# Patient Record
Sex: MALE | Race: WHITE | NOT HISPANIC OR LATINO | Employment: FULL TIME | URBAN - METROPOLITAN AREA
[De-identification: names, ages, dates, MRNs, and addresses within clinical notes are randomized per-mention and may not be internally consistent; named-entity substitution may affect disease eponyms.]

---

## 2017-06-18 DIAGNOSIS — E10.9 TYPE 1 DIABETES MELLITUS WITHOUT COMPLICATION (H): ICD-10-CM

## 2017-06-18 NOTE — LETTER
[facility]  [address] [facility] [address]  [phone]        June 25, 2018    Jon Ortiz  [address] [place] [id number]              Dear Jon Ortiz    This is to remind you that your FASTING LAB is due.    You may call our office at [phone] to schedule an appointment.    Please disregard this notice if you have already had your labs drawn or made an appointment.        Sincerely,        Deangelo Drew MD

## 2017-06-19 NOTE — TELEPHONE ENCOUNTER
HUMALOG 100 UNITS/ML VIAL           Last Written Prescription Date: 12/26/16  Last Fill Quantity: 1, # refills: 0  [place] [facility] or [facility] prescribing provider:  3/3/16        BP Readings from Last 3 Encounters:   03/03/16 124/88   07/16/14 115/77   12/17/13 133/87     Lab Results   Component Value Date    MICROL <5 03/03/2016     Lab Results   Component Value Date    UMALCR Unable to calculate due to low value 03/03/2016     Creatinine   Date Value Ref Range Status   03/03/2016 0.74 0.66 - 1.25 mg/dL Final   ]  GFR Estimate   Date Value Ref Range Status   03/03/2016 >90  Non  GFR Calc   >60 mL/min/1.7m2 Final   11/12/2013 >90 >60 mL/min/1.7m2 Final   11/06/2013 >90 >60 mL/min/1.7m2 Final     GFR Estimate If Black   Date Value Ref Range Status   03/03/2016 >90   GFR Calc   >60 mL/min/1.7m2 Final   11/12/2013 >90 >60 mL/min/1.7m2 Final   11/06/2013 >90 >60 mL/min/1.7m2 Final     Lab Results   Component Value Date    CHOL 177 03/03/2016     Lab Results   Component Value Date    HDL 97 03/03/2016     Lab Results   Component Value Date    LDL 72 03/03/2016     Lab Results   Component Value Date    TRIG 38 03/03/2016     Lab Results   Component Value Date    CHOLHDLRATIO 2.0 11/06/2013     Lab Results   Component Value Date    AST 23 03/03/2016     Lab Results   Component Value Date    ALT 31 03/03/2016     Lab Results   Component Value Date    A1C 5.8 03/03/2016    A1C 5.9 07/16/2014    A1C 5.9 11/06/2013    A1C 5.6 11/20/2012    A1C 6.2 02/22/2012     Potassium   Date Value Ref Range Status   03/03/2016 3.9 3.4 - 5.3 mmol/L Final

## 2017-06-20 NOTE — TELEPHONE ENCOUNTER
Medication is being filled for 1 time refill only due to:  Patient needs to be seen because it has been more than one year since last visit.   Yao Goins RN

## 2017-07-11 ENCOUNTER — OFFICE VISIT (OUTPATIENT)
Dept: FAMILY MEDICINE | Facility: CLINIC | Age: 42
End: 2017-07-11
Payer: COMMERCIAL

## 2017-07-11 VITALS
HEART RATE: 95 BPM | TEMPERATURE: 97.7 F | BODY MASS INDEX: 30.07 KG/M2 | SYSTOLIC BLOOD PRESSURE: 129 MMHG | DIASTOLIC BLOOD PRESSURE: 89 MMHG | WEIGHT: 198.4 LBS | HEIGHT: 68 IN

## 2017-07-11 DIAGNOSIS — E10.9 TYPE 1 DIABETES MELLITUS WITHOUT COMPLICATION (H): ICD-10-CM

## 2017-07-11 LAB
ANION GAP SERPL CALCULATED.3IONS-SCNC: 6 MMOL/L (ref 3–14)
BUN SERPL-MCNC: 11 MG/DL (ref 7–30)
CALCIUM SERPL-MCNC: 8.8 MG/DL (ref 8.5–10.1)
CHLORIDE SERPL-SCNC: 104 MMOL/L (ref 94–109)
CO2 SERPL-SCNC: 27 MMOL/L (ref 20–32)
CREAT SERPL-MCNC: 0.86 MG/DL (ref 0.66–1.25)
CREAT UR-MCNC: 24 MG/DL
GFR SERPL CREATININE-BSD FRML MDRD: ABNORMAL ML/MIN/1.7M2
GLUCOSE SERPL-MCNC: 114 MG/DL (ref 70–99)
HBA1C MFR BLD: 5.6 % (ref 4.3–6)
MICROALBUMIN UR-MCNC: <5 MG/L
MICROALBUMIN/CREAT UR: NORMAL MG/G CR (ref 0–17)
POTASSIUM SERPL-SCNC: 3.7 MMOL/L (ref 3.4–5.3)
SODIUM SERPL-SCNC: 137 MMOL/L (ref 133–144)

## 2017-07-11 PROCEDURE — 82043 UR ALBUMIN QUANTITATIVE: CPT | Performed by: FAMILY MEDICINE

## 2017-07-11 PROCEDURE — 99214 OFFICE O/P EST MOD 30 MIN: CPT | Performed by: FAMILY MEDICINE

## 2017-07-11 PROCEDURE — 83036 HEMOGLOBIN GLYCOSYLATED A1C: CPT | Performed by: FAMILY MEDICINE

## 2017-07-11 PROCEDURE — 80048 BASIC METABOLIC PNL TOTAL CA: CPT | Performed by: FAMILY MEDICINE

## 2017-07-11 PROCEDURE — 36415 COLL VENOUS BLD VENIPUNCTURE: CPT | Performed by: FAMILY MEDICINE

## 2017-07-11 NOTE — LETTER
[facility]  [address] [address] [facility] [address]  Phone: [phone]    July 13, 2017    Jon Ortiz  [address] [place] [address]              Dear Mr. Ortiz,    okay test result              Sincerely,      Dr. Deangelo Drew

## 2017-07-11 NOTE — PROGRESS NOTES
SUBJECTIVE:                                                    Jon Ortiz is a 42 year old male who presents to clinic today for the following health issues:    Diabetes Follow-up    Patient is checking blood sugars: 8-10 times daily.    Blood sugar testing frequency justification: Adjustment of medication(s)  Results are as follows:         Average for last 90 days 115, average for the last 30 days was 103    Diabetic concerns: None     Symptoms of hypoglycemia (low blood sugar): none     Paresthesias (numbness or burning in feet) or sores: No     Date of last diabetic eye exam: unknown      Amount of exercise or physical activity: None    Problems taking medications regularly: No    Medication side effects: none    Diet: regular (no restrictions)    Wt Readings from Last 10 Encounters:   07/11/17 198 lb 6.4 oz (90 kg)   03/03/16 191 lb 4.8 oz (86.8 kg)   07/16/14 160 lb 4.8 oz (72.7 kg)   12/17/13 178 lb 3.2 oz (80.8 kg)   11/06/13 173 lb 4.8 oz (78.6 kg)   11/20/12 169 lb (76.7 kg)   02/22/12 159 lb (72.1 kg)   03/02/11 169 lb (76.7 kg)   04/28/10 163 lb 8 oz (74.2 kg)   10/21/09 175 lb 12.8 oz (79.7 kg)           Problem list and histories reviewed & adjusted, as indicated.  Additional history: as documented    Patient Active Problem List   Diagnosis     Contact dermatitis and other eczema, due to unspecified cause     Other acne     CARDIOVASCULAR SCREENING; LDL GOAL LESS THAN 100     Health Care Home     Type 1 diabetes mellitus without complication (H)     Past Surgical History:   Procedure Laterality Date     C LASIK CONVENTIONAL  04/2008    Bilateral eyes.       Social History   Substance Use Topics     Smoking status: Never Smoker     Smokeless tobacco: Never Used     Alcohol use Yes      Comment: Rarely     Family History   Problem Relation Age of Onset     Allergies Mother      Arthritis Mother      CANCER Mother      HEART DISEASE Mother      HEART DISEASE Father      CANCER Father       "CEREBROVASCULAR DISEASE Maternal Grandfather      Alzheimer Disease Maternal Grandfather      HEART DISEASE Maternal Grandfather      HEART DISEASE Paternal Grandmother      Alcohol/Drug Paternal Grandmother      CEREBROVASCULAR DISEASE Paternal Grandfather      Alzheimer Disease Paternal Grandfather            Reviewed and updated as needed this visit by clinical staff       Reviewed and updated as needed this visit by Provider         ROS:  Constitutional, HEENT, cardiovascular, pulmonary, gi and gu systems are negative, except as otherwise noted.    OBJECTIVE:     /89  Pulse 95  Temp 97.7  F (36.5  C) (Tympanic)  Ht 5' 8\" (1.727 m)  Wt 198 lb 6.4 oz (90 kg)  BMI 30.17 kg/m2  Body mass index is 30.17 kg/(m^2).  GENERAL: healthy, alert and no distress  NECK: no adenopathy, no asymmetry, masses, or scars and thyroid normal to palpation  RESP: lungs clear to auscultation - no rales, rhonchi or wheezes  CV: regular rate and rhythm, normal S1 S2, no S3 or S4, no murmur, click or rub, no peripheral edema and peripheral pulses strong  ABDOMEN: soft, nontender, no hepatosplenomegaly, no masses and bowel sounds normal  MS: no gross musculoskeletal defects noted, no edema    Diagnostic Test Results:  none     ASSESSMENT/PLAN:     1. Type 1 diabetes mellitus without complication (H)   Good control.  Continue currant medications, continue to monito   - Hemoglobin A1c  - Basic metabolic panel  - Albumin Random Urine Quantitative    HDL(or \"good\" cholesterol) > ldl discussed stain use in diabetic. He declines now but will reconsiders when 47    Discussed ace inib   Pt declned   bp at goal    Will reconsider at age 47  Deangelo Drew MD  [facility]      "

## 2017-07-11 NOTE — NURSING NOTE
"Chief Complaint   Patient presents with     Diabetes       Initial There were no vitals taken for this visit. Estimated body mass index is 29.09 kg/(m^2) as calculated from the following:    Height as of 3/3/16: 5' 8\" (1.727 m).    Weight as of 3/3/16: 191 lb 4.8 oz (86.8 kg).  Medication Reconciliation: complete   Feli [place] CMA      "

## 2017-07-11 NOTE — MR AVS SNAPSHOT
"              After Visit Summary   2017    Jon Ortiz    MRN: [id number]           Patient Information     Date Of Birth          1975        Visit Information        Provider Department      2017 4:20 PM Deangelo Drew MD [facility]        Today's Diagnoses     Type 1 diabetes mellitus without complication (H)           Follow-ups after your visit        Who to contact     Normal or non-critical lab and imaging results will be communicated to you by [employer], letter or phone within 4 business days after the clinic has received the results. If you do not hear from us within 7 days, please contact the clinic through MyChart or phone. If you have a critical or abnormal lab result, we will notify you by phone as soon as possible.  Submit refill requests through [employer] or call your pharmacy and they will forward the refill request to us. Please allow 3 business days for your refill to be completed.          If you need to speak with a  for additional information , please call: [phone]             Additional Information About Your Visit        [employer] Information     [employer] lets you send messages to your doctor, view your test results, renew your prescriptions, schedule appointments and more. To sign up, go to [email or web address] . Click on \"Log in\" on the left side of the screen, which will take you to the Welcome page. Then click on \"Sign up Now\" on the right side of the page.     You will be asked to enter the access code listed below, as well as some personal information. Please follow the directions to create your username and password.     Your access code is: [email or web address]  Expires: 2017  1:20 PM     Your access code will  in 90 days. If you need help or a new code, please call your [facility] clinic or [phone].        Care EveryWhere ID     This is your Care EveryWhere ID. This could be used by other organizations to access your [facility] " "medical records  [id number]        Your Vitals Were     Pulse Temperature Height BMI (Body Mass Index)          95 97.7  F (36.5  C) (Tympanic) 5' 8\" (1.727 m) 30.17 kg/m2         Blood Pressure from Last 3 Encounters:   07/11/17 129/89   03/03/16 124/88   07/16/14 115/77    Weight from Last 3 Encounters:   07/11/17 198 lb 6.4 oz (90 kg)   03/03/16 191 lb 4.8 oz (86.8 kg)   07/16/14 160 lb 4.8 oz (72.7 kg)              We Performed the Following     Albumin Random Urine Quantitative     Basic metabolic panel     Hemoglobin A1c          Today's Medication Changes          These changes are accurate as of: 7/11/17 11:59 PM.  If you have any questions, ask your nurse or doctor.               These medicines have changed or have updated prescriptions.        Dose/Directions    insulin lispro 100 UNIT/ML injection   Commonly known as:  humaLOG   This may have changed:  See the new instructions.   Used for:  Type 1 diabetes mellitus without complication (H)   Changed by:  Deangelo Drew MD        USE FOR PUMP AS DIRECTED UP TO 90 UNITS PER DAY   Quantity:  30 mL   Refills:  11            Where to get your medicines      These medications were sent to [facility] [address] [address]     Phone:  [phone]     insulin lispro 100 UNIT/ML injection                Primary Care Provider Office Phone # Fax #    Deangelo Drew [phone]       [facility] [address] [facility] [address]        Equal Access to Services     JELANI SPARROW AH: Hadii aad ku hadasho Soomaali, waaxda luqadaha, qaybta kaalmada adeegyada, anthony early. So [facility] [phone].    ATENCIÓN: Si habla español, tiene a whitaker disposición servicios gratuitos de asistencia lingüística. Llame al [phone].    We comply with applicable federal civil rights laws and [place] laws. We do not discriminate on the basis of " race, color, national origin, age, disability sex, sexual orientation or gender identity.            Thank you!     Thank you for choosing [facility]  for your care. Our goal is always to provide you with excellent care. Hearing back from our patients is one way we can continue to improve our services. Please take a few minutes to complete the written survey that you may receive in the mail after your visit with us. Thank you!             Your Updated Medication List - Protect others around you: Learn how to safely use, store and throw away your medicines at www.disposemymeds.org.          This list is accurate as of: 7/11/17 11:59 PM.  Always use your most recent med list.                   Brand Name Dispense Instructions for use Diagnosis    * ACE NOT PRESCRIBED (INTENTIONAL)      by Other route continuous prn.    Type I (juvenile type) diabetes mellitus without mention of complication, not stated as uncontrolled       aspirin 81 MG tablet      Take 1 tablet by mouth daily.        blood glucose monitoring test strip    no brand specified    9 Box    Test blood sugars 11 times per day.    Type 1 diabetes mellitus without complication (H)       clindamycin 1 % topical gel    CLINDAMAX    180 g    Apply  topically 2 times daily.    Rosacea       insulin infusion pump Ying     1 Device    daily    Type I (juvenile type) diabetes mellitus without mention of complication, not stated as uncontrolled       insulin lispro 100 UNIT/ML injection    humaLOG    30 mL    USE FOR PUMP AS DIRECTED UP TO 90 UNITS PER DAY    Type 1 diabetes mellitus without complication (H)       Multi-vitamin Tabs tablet      Take 1 tablet by mouth daily        * STATIN NOT PRESCRIBED (INTENTIONAL)     0 each    1 each continuous prn Statin not prescribed intentionally due to Other: LDL < 100 w/o statin    Type 1 diabetes, HbA1c goal < 7% (H)       * Notice:  This list has 2 medication(s) that are the same as other medications  prescribed for you. Read the directions carefully, and ask your doctor or other care provider to review them with you.

## 2017-07-12 DIAGNOSIS — E10.9 TYPE 1 DIABETES MELLITUS WITHOUT COMPLICATION (H): ICD-10-CM

## 2017-12-19 ENCOUNTER — TELEPHONE (OUTPATIENT)
Dept: FAMILY MEDICINE | Facility: CLINIC | Age: 42
End: 2017-12-19

## 2017-12-19 DIAGNOSIS — Z13.6 CARDIOVASCULAR SCREENING; LDL GOAL LESS THAN 100: Primary | ICD-10-CM

## 2018-06-23 DIAGNOSIS — E10.9 TYPE 1 DIABETES MELLITUS WITHOUT COMPLICATION (H): ICD-10-CM

## 2018-06-25 NOTE — TELEPHONE ENCOUNTER
Medication is being filled for 1 time refill only due to:  Patient needs labs lipids,hgba1c,microalbumin,cmp, thyroid. Future labs ordered yes. Patient needs to be seen because it will be one year.   Yao Goins RN

## 2018-06-25 NOTE — TELEPHONE ENCOUNTER
"HUMALOG VIAL 10ML 100U/ML        Last Written Prescription Date:  7/11/17  Last Fill Quantity: 30,   # refills: 11  Last Office Visit: 7/11/17  Future Office visit:       Requested Prescriptions   Pending Prescriptions Disp Refills     HUMALOG 100 UNIT/ML injection [Pharmacy Med Name: HUMALOG VIAL 10ML 100U/ML] 30 mL 11     Sig: USE FOR PUMP AS DIRECTED UP TO 90 UNITS PER DAY    Short Acting Insulin Protocol Failed    6/23/2018 11:08 PM       Failed - Blood pressure less than 140/90 in past 6 months    BP Readings from Last 3 Encounters:   07/11/17 129/89   03/03/16 124/88   07/16/14 115/77                Failed - LDL on file in past 12 months    Recent Labs   Lab Test  03/03/16   0936   LDL  72            Failed - HgbA1C in past 3 or 6 months    If HgbA1C is 8 or greater, it needs to be on file within the past 3 months.  If less than 8, must be on file within the past 6 months.     Recent Labs   Lab Test  07/11/17   1643   A1C  5.6            Failed - Recent (6 mo) or future (30 days) visit within the authorizing provider's specialty    Patient had office visit in the last 6 months or has a visit in the next 30 days with authorizing provider or within the authorizing provider's specialty.  See \"Patient Info\" tab in inbasket, or \"Choose Columns\" in Meds & Orders section of the refill encounter.           Passed - Microalbumin on file in past 12 months    Recent Labs   Lab Test  07/11/17   1643   MICROL  <5   UMALCR  Unable to calculate due to low value            Passed - Serum creatinine on file in past 12 months    Recent Labs   Lab Test  07/11/17   1643   CR  0.86            Passed - Patient is age 18 or older            "

## 2018-07-13 ENCOUNTER — TELEPHONE (OUTPATIENT)
Dept: FAMILY MEDICINE | Facility: CLINIC | Age: 43
End: 2018-07-13

## 2018-07-13 NOTE — LETTER
January 8, 2019      Jon Ortiz  [address] [facility] [address]        Dear Jon,     As part of [facility]'s commitment to health and wellness we have reviewed your chart and it indicates that you are due for one or more of the following:    -- A diabetic check appointment with fasting blood work. The last A1c results we have for you were from 07/19/2018. It is important that we see you every 6 months to be sure we are doing everything we can to keep your diabetes under control and refill medications. Please call to schedule an appointment. Also, plan to come fasting at least 8-10 hours prior to your appointment for lab work.    Please try to schedule and/or complete the tests above within the next 2-4 weeks.   The number to call to schedule an appointment at [facility] is [phone].    While we work hard to maintain accurate records, it is always possible that this notice does not accurately reflect tests that you may have had. To ensure that we do not send you unnecessary notices please verify that we have accurate dates of your tests (even if these were done many years ago) or if you are seeking care at another clinic.      Sincerely,     Deangelo Drew MD/  [facility] Care Team

## 2018-07-13 NOTE — TELEPHONE ENCOUNTER
Panel Management Review      Patient has the following on his problem list:     Diabetes    ASA: Passed    Last A1C  Lab Results   Component Value Date    A1C 5.6 07/11/2017    A1C 5.8 03/03/2016    A1C 5.9 07/16/2014    A1C 5.9 11/06/2013    A1C 5.6 11/20/2012     A1C tested: Passed    Last LDL:    Lab Results   Component Value Date    CHOL 177 03/03/2016     Lab Results   Component Value Date    HDL 97 03/03/2016     Lab Results   Component Value Date    LDL 72 03/03/2016     Lab Results   Component Value Date    TRIG 38 03/03/2016     Lab Results   Component Value Date    CHOLHDLRATIO 2.0 11/06/2013     Lab Results   Component Value Date    NHDL 80 03/03/2016       Is the patient on a Statin? NO             Is the patient on Aspirin? YES    Medications     HMG CoA Reductase Inhibitors    STATIN NOT PRESCRIBED, INTENTIONAL,    STATIN NOT PRESCRIBED, INTENTIONAL,    Salicylates    aspirin 81 MG tablet          Last three blood pressure readings:  BP Readings from Last 3 Encounters:   07/11/17 129/89   03/03/16 124/88   07/16/14 115/77       Date of last diabetes office visit: 07/11/2017     Tobacco History:     History   Smoking Status     Never Smoker   Smokeless Tobacco     Never Used           Composite cancer screening  Chart review shows that this patient is due/due soon for the following None  Summary:    Patient is due/failing the following:   Diabetes, A1C and LDL    Action needed:   Patient needs office visit for a diabetic check.    Type of outreach:    Phone, left message for patient to call back.     Questions for provider review:    None                                                                                                                                    Esme Welsh CMA     Chart routed to self.

## 2018-07-18 NOTE — TELEPHONE ENCOUNTER
Please call, got refill requests.  I can cmplte that as soon as he scheduled f/u visit and has labs  A1c, chem 8, micro albumin, thyroid and fasting lipids checked.

## 2018-07-19 DIAGNOSIS — E10.9 TYPE 1 DIABETES MELLITUS WITHOUT COMPLICATION (H): ICD-10-CM

## 2018-07-19 DIAGNOSIS — E78.5 HYPERLIPIDEMIA LDL GOAL <100: Primary | ICD-10-CM

## 2018-07-19 LAB
ANION GAP SERPL CALCULATED.3IONS-SCNC: 8 MMOL/L (ref 3–14)
BUN SERPL-MCNC: 7 MG/DL (ref 7–30)
CALCIUM SERPL-MCNC: 8.5 MG/DL (ref 8.5–10.1)
CHLORIDE SERPL-SCNC: 103 MMOL/L (ref 94–109)
CHOLEST SERPL-MCNC: 206 MG/DL
CO2 SERPL-SCNC: 26 MMOL/L (ref 20–32)
CREAT SERPL-MCNC: 0.76 MG/DL (ref 0.66–1.25)
CREAT UR-MCNC: 39 MG/DL
GFR SERPL CREATININE-BSD FRML MDRD: >90 ML/MIN/1.7M2
GLUCOSE SERPL-MCNC: 142 MG/DL (ref 70–99)
HBA1C MFR BLD: 6.1 % (ref 0–5.6)
HDLC SERPL-MCNC: 66 MG/DL
LDLC SERPL CALC-MCNC: 124 MG/DL
MICROALBUMIN UR-MCNC: <5 MG/L
MICROALBUMIN/CREAT UR: NORMAL MG/G CR (ref 0–17)
NONHDLC SERPL-MCNC: 140 MG/DL
POTASSIUM SERPL-SCNC: 4 MMOL/L (ref 3.4–5.3)
SODIUM SERPL-SCNC: 137 MMOL/L (ref 133–144)
TRIGL SERPL-MCNC: 81 MG/DL
TSH SERPL DL<=0.005 MIU/L-ACNC: 1.2 MU/L (ref 0.4–4)

## 2018-07-19 PROCEDURE — 82043 UR ALBUMIN QUANTITATIVE: CPT | Performed by: FAMILY MEDICINE

## 2018-07-19 PROCEDURE — 84443 ASSAY THYROID STIM HORMONE: CPT | Performed by: FAMILY MEDICINE

## 2018-07-19 PROCEDURE — 83036 HEMOGLOBIN GLYCOSYLATED A1C: CPT | Performed by: FAMILY MEDICINE

## 2018-07-19 PROCEDURE — 36415 COLL VENOUS BLD VENIPUNCTURE: CPT | Performed by: FAMILY MEDICINE

## 2018-07-19 PROCEDURE — 80061 LIPID PANEL: CPT | Performed by: FAMILY MEDICINE

## 2018-07-19 PROCEDURE — 80048 BASIC METABOLIC PNL TOTAL CA: CPT | Performed by: FAMILY MEDICINE

## 2018-07-20 RX ORDER — ATORVASTATIN CALCIUM 10 MG/1
10 TABLET, FILM COATED ORAL DAILY
Qty: 90 TABLET | Refills: 3 | Status: SHIPPED | OUTPATIENT
Start: 2018-07-20 | End: 2020-05-13

## 2018-07-22 DIAGNOSIS — E10.9 TYPE 1 DIABETES MELLITUS WITHOUT COMPLICATION (H): ICD-10-CM

## 2018-07-23 NOTE — TELEPHONE ENCOUNTER
"HUMALOG VIAL 10ML 100U/ML        Last Written Prescription Date:  6/25/18  Last Fill Quantity: 30,   # refills: 0  Last Office Visit: 7/11/17  Future Office visit:    Next 5 appointments (look out 90 days)     Aug 02, 2018  4:20 PM CDT   [facility] Drew MD   [facility]    [address] [facility] [address] [phone]                   Requested Prescriptions   Pending Prescriptions Disp Refills     HUMALOG 100 UNIT/ML injection [Pharmacy Med Name: HUMALOG VIAL 10ML 100U/ML] 30 mL 0     Sig: USE FOR PUMP AS DIRECTED UP TO 90 UNITS PER DAY (NEEDS FASTING LAB AND DOCTOR APPOINTMENT BEFORE FURTHER REFILLS AS IT WILL BE OVER A YEAR)    Short Acting Insulin Protocol Failed    7/22/2018  3:03 PM       Failed - Blood pressure less than 140/90 in past 6 months    BP Readings from Last 3 Encounters:   07/11/17 129/89   03/03/16 124/88   07/16/14 115/77                Passed - LDL on file in past 12 months    Recent Labs   Lab Test  07/19/18   1415   LDL  124*            Passed - Microalbumin on file in past 12 months    Recent Labs   Lab Test  07/19/18   1422   MICROL  <5   UMALCR  Unable to calculate due to low value            Passed - Serum creatinine on file in past 12 months    Recent Labs   Lab Test  07/19/18   1415   CR  0.76            Passed - HgbA1C in past 3 or 6 months    If HgbA1C is 8 or greater, it needs to be on file within the past 3 months.  If less than 8, must be on file within the past 6 months.     Recent Labs   Lab Test  07/19/18   1415   A1C  6.1*            Passed - Patient is age 18 or older       Passed - Recent (6 mo) or future (30 days) visit within the authorizing provider's specialty    Patient had office visit in the last 6 months or has a visit in the next 30 days with authorizing provider or within the authorizing provider's specialty.  See \"Patient Info\" tab in inbasket, or \"Choose Columns\" in Meds & Orders section of the refill " encounter.

## 2018-07-24 NOTE — TELEPHONE ENCOUNTER
July 18, 2018      Deangelo Drew MD        Please call, got refill requests.  I can cmplte that as soon as he scheduled f/u visit and has labs  A1c, chem 8, micro albumin, thyroid and fasting lipids checked             Lab Results   Component Value Date    A1C 6.1 07/19/2018    A1C 5.6 07/11/2017    A1C 5.8 03/03/2016    A1C 5.9 07/16/2014    A1C 5.9 11/06/2013     Lab Results   Component Value Date    CHOL 206 07/19/2018     Lab Results   Component Value Date    HDL 66 07/19/2018     Lab Results   Component Value Date     07/19/2018     Lab Results   Component Value Date    TRIG 81 07/19/2018     Lab Results   Component Value Date    CHOLHDLRATIO 2.0 11/06/2013     TSH   Date Value Ref Range Status   07/19/2018 1.20 0.40 - 4.00 mU/L Final     Recent Labs   Lab Test  07/19/18   1415  07/11/17   1643   NA  137  137   POTASSIUM  4.0  3.7   CHLORIDE  103  104   CO2  26  27   ANIONGAP  8  6   GLC  142*  114*   BUN  7  11   CR  0.76  0.86   CARMELINA  8.5  8.8     Refilled x 30 days as patient has completed labs and has follow up appointment on 8/2/18.    Anamaria Roberts RN

## 2018-08-13 ENCOUNTER — OFFICE VISIT (OUTPATIENT)
Dept: FAMILY MEDICINE | Facility: CLINIC | Age: 43
End: 2018-08-13
Payer: COMMERCIAL

## 2018-08-13 VITALS
HEART RATE: 74 BPM | BODY MASS INDEX: 32.25 KG/M2 | DIASTOLIC BLOOD PRESSURE: 85 MMHG | SYSTOLIC BLOOD PRESSURE: 125 MMHG | WEIGHT: 212.8 LBS | HEIGHT: 68 IN | TEMPERATURE: 97.9 F

## 2018-08-13 DIAGNOSIS — Z11.4 SCREENING FOR HIV (HUMAN IMMUNODEFICIENCY VIRUS): ICD-10-CM

## 2018-08-13 DIAGNOSIS — Z13.89 SCREENING FOR DIABETIC PERIPHERAL NEUROPATHY: ICD-10-CM

## 2018-08-13 DIAGNOSIS — E10.9 TYPE 1 DIABETES MELLITUS WITHOUT COMPLICATION (H): Primary | ICD-10-CM

## 2018-08-13 DIAGNOSIS — Z13.6 CARDIOVASCULAR SCREENING; LDL GOAL LESS THAN 100: ICD-10-CM

## 2018-08-13 PROCEDURE — 99207 C FOOT EXAM  NO CHARGE: CPT | Performed by: FAMILY MEDICINE

## 2018-08-13 PROCEDURE — 99214 OFFICE O/P EST MOD 30 MIN: CPT | Performed by: FAMILY MEDICINE

## 2018-08-13 ASSESSMENT — PAIN SCALES - GENERAL: PAINLEVEL: NO PAIN (0)

## 2018-08-13 NOTE — MR AVS SNAPSHOT
"              After Visit Summary   2018    Jon Ortiz    MRN: [id number]           Patient Information     Date Of Birth          1975        Visit Information        Provider Department      2018 6:20 PM Deangelo Drew MD [facility]        Today's Diagnoses     Type 1 diabetes mellitus without complication (H)    -  1    Screening for HIV (human immunodeficiency virus)        Screening for diabetic peripheral neuropathy        CARDIOVASCULAR SCREENING; LDL GOAL LESS THAN 100           Follow-ups after your visit        Who to contact     Normal or non-critical lab and imaging results will be communicated to you by Omnicademyhart, letter or phone within 4 business days after the clinic has received the results. If you do not hear from us within 7 days, please contact the clinic through Omnicademyhart or phone. If you have a critical or abnormal lab result, we will notify you by phone as soon as possible.  Submit refill requests through Mashape or call your pharmacy and they will forward the refill request to us. Please allow 3 business days for your refill to be completed.          If you need to speak with a  for additional information , please call: [phone]             Additional Information About Your Visit        [facility] Information     Mashape lets you send messages to your doctor, view your test results, renew your prescriptions, schedule appointments and more. To sign up, go to [email or web address] . Click on \"Log in\" on the left side of the screen, which will take you to the Welcome page. Then click on \"Sign up Now\" on the right side of the page.     You will be asked to enter the access code listed below, as well as some personal information. Please follow the directions to create your username and password.     Your access code is: [email or web address]  Expires: 2018  5:22 PM     Your access code will  in 90 days. If you need help or a new code, please call " "your [place] clinic or [phone].        Care EveryWhere ID     This is your Care EveryWhere ID. This could be used by other organizations to access your [place] medical records  [id number]        Your Vitals Were     Pulse Temperature Height BMI (Body Mass Index)          74 97.9  F (36.6  C) (Tympanic) 5' 8\" (1.727 m) 32.36 kg/m2         Blood Pressure from Last 3 Encounters:   08/13/18 125/85   07/11/17 129/89   03/03/16 124/88    Weight from Last 3 Encounters:   08/13/18 212 lb 12.8 oz (96.5 kg)   07/11/17 198 lb 6.4 oz (90 kg)   03/03/16 191 lb 4.8 oz (86.8 kg)              We Performed the Following     FOOT EXAM  NO CHARGE [14730.114]        Primary Care Provider Office Phone # Fax #    Deangelo Drew [phone] [id number] [facility] [address]        Equal Access to Services     [facility]ATHARINA [facility]ON[facility] : Hadii freeman ku hadasho Soomaali, waaxda luqadaha, qaybta kaalmada adeegyada, waxricarda valles . So [facility] [phone].    ATENCIÓN: Si habla español, tiene a whitaker disposición servicios gratuitos de asistencia lingüística. LlVan [facility] [phone].    We comply with applicable federal civil rights laws and [place] laws. We do not discriminate on the basis of race, color, national origin, age, disability, sex, sexual orientation, or gender identity.            Thank you!     Thank you for choosing [facility]  for your care. Our goal is always to provide you with excellent care. Hearing back from our patients is one way we can continue to improve our services. Please take a few minutes to complete the written survey that you may receive in the mail after your visit with us. Thank you!             Your Updated Medication List - Protect others around you: Learn how to safely use, store and throw away your medicines at www.disposemymeds.org.          This list is accurate as of 8/13/18 11:59 PM.  Always use your most recent med list.                   Brand " Name Dispense Instructions for use Diagnosis    * ACE NOT PRESCRIBED (INTENTIONAL)      by Other route continuous prn.    Type I (juvenile type) diabetes mellitus without mention of complication, not stated as uncontrolled       ACE/ARB/ARNI NOT PRESCRIBED (INTENTIONAL)      Please choose reason not prescribed, below    Type 1 diabetes mellitus without complication (H)       aspirin 81 MG tablet      Take 1 tablet by mouth daily.        atorvastatin 10 MG tablet    LIPITOR    90 tablet    Take 1 tablet (10 mg) by mouth daily    Hyperlipidemia LDL goal <100       blood glucose monitoring test strip    no brand specified    9 Box    Test blood sugars 11 times per day.    Type 1 diabetes mellitus without complication (H)       clindamycin 1 % topical gel    CLINDAMAX    180 g    Apply  topically 2 times daily.    Rosacea       insulin infusion pump Ying     1 Device    daily    Type I (juvenile type) diabetes mellitus without mention of complication, not stated as uncontrolled       insulin lispro 100 UNIT/ML injection    humaLOG    30 mL    USE FOR PUMP AS DIRECTED UP TO 90 UNITS PER DAY    Type 1 diabetes mellitus without complication (H)       Multi-vitamin Tabs tablet      Take 1 tablet by mouth daily        * STATIN NOT PRESCRIBED (INTENTIONAL)     0 each    1 each continuous prn Statin not prescribed intentionally due to Other: LDL < 100 w/o statin    Type 1 diabetes, HbA1c goal < 7% (H)       * Notice:  This list has 2 medication(s) that are the same as other medications prescribed for you. Read the directions carefully, and ask your doctor or other care provider to review them with you.

## 2018-08-13 NOTE — PROGRESS NOTES
SUBJECTIVE:                                                    Jon Ortiz is a 43 year old male who presents to clinic today for the following health issues:    Diabetes Follow-up    Patient is checking blood sugars: 6-10 times daily.    Blood sugar testing frequency justification: Patient modifying lifestyle changes (diet, exercise) with blood sugars  Results are as follows:         am - 100-120         lunchtime - 100-120         suppertime - 100-120    Diabetic concerns: None     Symptoms of hypoglycemia (low blood sugar): none     Paresthesias (numbness or burning in feet) or sores: No     Date of last diabetic eye exam: It has been awhile and he is aware he is due for one.     BP Readings from Last 2 Encounters:   08/13/18 125/85   07/11/17 129/89     Hemoglobin A1C (%)   Date Value   07/19/2018 6.1 (H)   07/11/2017 5.6     LDL Cholesterol Calculated (mg/dL)   Date Value   07/19/2018 124 (H)   03/03/2016 72       Diabetes Management Resources  Hyperlipidemia Follow-Up      Rate your low fat/cholesterol diet?: not monitoring fat    Taking statin?  Yes, no muscle aches from statin    Other lipid medications/supplements?:  none      Amount of exercise or physical activity: 1 day/week for an average of 45-60 minutes    Problems taking medications regularly: No    Medication side effects: none    Diet: regular (no restrictions)    Problem list and histories reviewed & adjusted, as indicated.  Additional history:     Patient Active Problem List   Diagnosis     Contact dermatitis and other eczema, due to unspecified cause     Other acne     CARDIOVASCULAR SCREENING; LDL GOAL LESS THAN 100     Health Care Home     Type 1 diabetes mellitus without complication (H)     Past Surgical History:   Procedure Laterality Date     C LASIK CONVENTIONAL  04/2008    Bilateral eyes.       Social History   Substance Use Topics     Smoking status: Never Smoker     Smokeless tobacco: Never Used     Alcohol use Yes      Comment:  "Rarely     Family History   Problem Relation Age of Onset     Allergies Mother      Arthritis Mother      Cancer Mother      HEART DISEASE Mother      HEART DISEASE Father      Cancer Father      Cerebrovascular Disease Maternal Grandfather      Alzheimer Disease Maternal Grandfather      HEART DISEASE Maternal Grandfather      HEART DISEASE Paternal Grandmother      Alcohol/Drug Paternal Grandmother      Cerebrovascular Disease Paternal Grandfather      Alzheimer Disease Paternal Grandfather            ROS:  Constitutional, HEENT, cardiovascular, pulmonary, gi and gu systems are negative, except as otherwise noted.    OBJECTIVE:                                                    /85 (BP Location: Right arm, Patient Position: Sitting, Cuff Size: Adult Large)  Pulse 74  Temp 97.9  F (36.6  C) (Tympanic)  Ht 5' 8\" (1.727 m)  Wt 212 lb 12.8 oz (96.5 kg)  BMI 32.36 kg/m2 Body mass index is 32.36 kg/(m^2).     GENERAL: healthy, alert, well nourished, well hydrated, no distress  HENT: ear canals- normal; TMs- normal; Nose- normal; Mouth- no ulcers, no lesions  NECK: no tenderness, no adenopathy, no asymmetry, no masses, no stiffness; thyroid- normal to palpation  RESP: lungs clear to auscultation - no rales, no rhonchi, no wheezes  CV: regular rates and rhythm, normal S1 S2, no S3 or S4 and no murmur, no click or rub -  ABDOMEN: soft, no tenderness, no  hepatosplenomegaly, no masses, normal bowel sounds       ASSESSMENT/PLAN:                                                       reports that he has never smoked. He has never used smokeless tobacco.  (E10.9) Type 1 diabetes mellitus without complication (H)  (primary encounter diagnosis)      (Z11.4) Screening for HIV (human immunodeficiency virus)  Plan: HIV Screening, CANCELED: HIV Screening     (Z13.89) Screening for diabetic peripheral neuropathy  Plan: FOOT EXAM  NO CHARGE [93884.114]        [facility]"

## 2018-08-29 DIAGNOSIS — E10.9 TYPE 1 DIABETES MELLITUS WITHOUT COMPLICATION (H): ICD-10-CM

## 2018-08-29 NOTE — TELEPHONE ENCOUNTER
Prescription approved per [facility] Refill Protocol.  Yao Goins RN

## 2018-08-29 NOTE — TELEPHONE ENCOUNTER
"HUMALOG VIAL 10ML 100U/ML        Last Written Prescription Date:  7/24/18  Last Fill Quantity: 30,   # refills: 0  Last Office Visit: 8/13/18  Future Office visit:       Requested Prescriptions   Pending Prescriptions Disp Refills     HUMALOG 100 UNIT/ML injection [Pharmacy Med Name: HUMALOG VIAL 10ML 100U/ML] 30 mL 0     Sig: USE FOR PUMP AS DIRECTED UP TO 90 UNITS PER DAY    Short Acting Insulin Protocol Passed    8/29/2018  9:22 AM       Passed - Blood pressure less than 140/90 in past 6 months    BP Readings from Last 3 Encounters:   08/13/18 125/85   07/11/17 129/89   03/03/16 124/88                Passed - LDL on file in past 12 months    Recent Labs   Lab Test  07/19/18   1415   LDL  124*            Passed - Microalbumin on file in past 12 months    Recent Labs   Lab Test  07/19/18   1422   MICROL  <5   UMALCR  Unable to calculate due to low value            Passed - Serum creatinine on file in past 12 months    Recent Labs   Lab Test  07/19/18   1415   CR  0.76            Passed - HgbA1C in past 3 or 6 months    If HgbA1C is 8 or greater, it needs to be on file within the past 3 months.  If less than 8, must be on file within the past 6 months.     Recent Labs   Lab Test  07/19/18   1415   A1C  6.1*            Passed - Patient is age 18 or older       Passed - Recent (6 mo) or future (30 days) visit within the authorizing provider's specialty    Patient had office visit in the last 6 months or has a visit in the next 30 days with authorizing provider or within the authorizing provider's specialty.  See \"Patient Info\" tab in inbasket, or \"Choose Columns\" in Meds & Orders section of the refill encounter.              "

## 2018-11-16 DIAGNOSIS — E10.9 TYPE 1 DIABETES MELLITUS WITHOUT COMPLICATION (H): ICD-10-CM

## 2018-11-16 NOTE — TELEPHONE ENCOUNTER
"Requested Prescriptions   Pending Prescriptions Disp Refills     HUMALOG 100 UNIT/ML injection [Pharmacy Med Name: HUMALOG VIAL 10ML 100U/ML] 30 mL 3     Sig: USE FOR PUMP AS DIRECTED UP TO 90 UNITS PER DAY    Short Acting Insulin Protocol Passed    11/16/2018  2:44 PM       Passed - Blood pressure less than 140/90 in past 6 months    BP Readings from Last 3 Encounters:   08/13/18 125/85   07/11/17 129/89   03/03/16 124/88                Passed - LDL on file in past 12 months    Recent Labs   Lab Test  07/19/18   1415   LDL  124*            Passed - Microalbumin on file in past 12 months    Recent Labs   Lab Test  07/19/18   1422   MICROL  <5   UMALCR  Unable to calculate due to low value            Passed - Serum creatinine on file in past 12 months    Recent Labs   Lab Test  07/19/18   1415   CR  0.76            Passed - HgbA1C in past 3 or 6 months    If HgbA1C is 8 or greater, it needs to be on file within the past 3 months.  If less than 8, must be on file within the past 6 months.     Recent Labs   Lab Test  07/19/18   1415   A1C  6.1*            Passed - Patient is age 18 or older       Passed - Recent (6 mo) or future (30 days) visit within the authorizing provider's specialty    Patient had office visit in the last 6 months or has a visit in the next 30 days with authorizing provider or within the authorizing provider's specialty.  See \"Patient Info\" tab in inbasket, or \"Choose Columns\" in Meds & Orders section of the refill encounter.            Last Written Prescription Date:  8/29/18  Last Fill Quantity: 30 ml,  # refills: 3   Last office visit: 8/13/2018 with prescribing provider:     Future Office Visit:      "

## 2019-02-13 DIAGNOSIS — E10.9 TYPE 1 DIABETES MELLITUS WITHOUT COMPLICATION (H): ICD-10-CM

## 2019-02-13 NOTE — TELEPHONE ENCOUNTER
"HUMALOG VIAL 10ML 100U/ML      Last Written Prescription Date:  11/19/18  Last Fill Quantity: 30,   # refills: 3  Last Office Visit: 8/13/18  Future Office visit:       Requested Prescriptions   Pending Prescriptions Disp Refills     HUMALOG 100 UNIT/ML injection [Pharmacy Med Name: HUMALOG VIAL 10ML 100U/ML] 30 mL 3     Sig: USE FOR PUMP AS DIRECTED UP TO 90 UNITS PER DAY    Short Acting Insulin Protocol Failed - 2/13/2019 11:11 AM       Failed - Blood pressure less than 140/90 in past 6 months    BP Readings from Last 3 Encounters:   08/13/18 125/85   07/11/17 129/89   03/03/16 124/88                Failed - HgbA1C in past 3 or 6 months    If HgbA1C is 8 or greater, it needs to be on file within the past 3 months.  If less than 8, must be on file within the past 6 months.     Recent Labs   Lab Test 07/19/18  1415   A1C 6.1*            Failed - Recent (6 mo) or future (30 days) visit within the authorizing provider's specialty    Patient had office visit in the last 6 months or has a visit in the next 30 days with authorizing provider or within the authorizing provider's specialty.  See \"Patient Info\" tab in inbasket, or \"Choose Columns\" in Meds & Orders section of the refill encounter.           Passed - LDL on file in past 12 months    Recent Labs   Lab Test 07/19/18  1415   *            Passed - Microalbumin on file in past 12 months    Recent Labs   Lab Test 07/19/18  1422   MICROL <5   UMALCR Unable to calculate due to low value            Passed - Serum creatinine on file in past 12 months    Recent Labs   Lab Test 07/19/18  1415   CR 0.76            Passed - Medication is active on med list       Passed - Patient is age 18 or older          "

## 2019-02-22 ENCOUNTER — TELEPHONE (OUTPATIENT)
Dept: FAMILY MEDICINE | Facility: CLINIC | Age: 44
End: 2019-02-22

## 2019-03-22 DIAGNOSIS — E10.9 TYPE 1 DIABETES MELLITUS WITHOUT COMPLICATION (H): ICD-10-CM

## 2019-03-22 NOTE — TELEPHONE ENCOUNTER
"HUMALOG VIAL 10ML 100U/ML      Last Written Prescription Date:  2/13/19  Last Fill Quantity: 30,   # refills: 0  Last Office Visit: 8/13/18  Future Office visit:       Requested Prescriptions   Pending Prescriptions Disp Refills     HUMALOG 100 UNIT/ML injection [Pharmacy Med Name: HUMALOG VIAL 10ML 100U/ML] 30 mL 0     Sig: USE FOR PUMP AS DIRECTED UP TO 90 UNITS PER DAY    Short Acting Insulin Protocol Failed - 3/22/2019  3:09 PM       Failed - Blood pressure less than 140/90 in past 6 months    BP Readings from Last 3 Encounters:   08/13/18 125/85   07/11/17 129/89   03/03/16 124/88                Failed - HgbA1C in past 3 or 6 months    If HgbA1C is 8 or greater, it needs to be on file within the past 3 months.  If less than 8, must be on file within the past 6 months.     Recent Labs   Lab Test 07/19/18  1415   A1C 6.1*            Failed - Recent (6 mo) or future (30 days) visit within the authorizing provider's specialty    Patient had office visit in the last 6 months or has a visit in the next 30 days with authorizing provider or within the authorizing provider's specialty.  See \"Patient Info\" tab in inbasket, or \"Choose Columns\" in Meds & Orders section of the refill encounter.           Passed - LDL on file in past 12 months    Recent Labs   Lab Test 07/19/18  1415   *            Passed - Microalbumin on file in past 12 months    Recent Labs   Lab Test 07/19/18  1422   MICROL <5   UMALCR Unable to calculate due to low value            Passed - Serum creatinine on file in past 12 months    Recent Labs   Lab Test 07/19/18  1415   CR 0.76            Passed - Medication is active on med list       Passed - Patient is age 18 or older          "

## 2019-03-25 ENCOUNTER — TELEPHONE (OUTPATIENT)
Dept: FAMILY MEDICINE | Facility: CLINIC | Age: 44
End: 2019-03-25

## 2019-03-25 DIAGNOSIS — E10.9 TYPE 1 DIABETES MELLITUS WITHOUT COMPLICATION (H): Primary | ICD-10-CM

## 2019-03-25 NOTE — TELEPHONE ENCOUNTER
Reason for Call: Request for an order or referral:    Order or referral being requested: Jon has lab appointment on 3/29/19 for diabetic check on 4/1/19.  He needs A1C.  Back on 7/18 he had lipids done, but was suppose to come back in 3-6 months for recheck.  Would like that one done also.  He will be fasting.  Could you please review and place orders.  Thank you..Carmen Ferrell    Date needed: as soon as possible    Has the patient been seen by the PCP for this problem? NO - seeing provider on 4/1/19.      Phone number Patient can be reached at:  Home number on file [phone] (home)    Best Time:  No need to call unless something is changed or has questions.      Can we leave a detailed message on this number?  YES    Call taken on 3/25/2019 at 2:50 PM by Carmen Ferrell

## 2019-03-25 NOTE — TELEPHONE ENCOUNTER
Prescription approved per [facility] Refill Protocol.  Yao Goins RN

## 2019-03-29 DIAGNOSIS — E10.9 TYPE 1 DIABETES MELLITUS WITHOUT COMPLICATION (H): ICD-10-CM

## 2019-03-29 DIAGNOSIS — Z11.4 SCREENING FOR HIV (HUMAN IMMUNODEFICIENCY VIRUS): ICD-10-CM

## 2019-03-29 LAB
CHOLEST SERPL-MCNC: 161 MG/DL
HBA1C MFR BLD: 5.5 % (ref 0–5.6)
HDLC SERPL-MCNC: 68 MG/DL
LDLC SERPL CALC-MCNC: 86 MG/DL
NONHDLC SERPL-MCNC: 93 MG/DL
TRIGL SERPL-MCNC: 33 MG/DL

## 2019-03-29 PROCEDURE — 83036 HEMOGLOBIN GLYCOSYLATED A1C: CPT | Performed by: FAMILY MEDICINE

## 2019-03-29 PROCEDURE — 80061 LIPID PANEL: CPT | Performed by: FAMILY MEDICINE

## 2019-03-29 PROCEDURE — 36415 COLL VENOUS BLD VENIPUNCTURE: CPT | Performed by: FAMILY MEDICINE

## 2019-03-29 PROCEDURE — 87389 HIV-1 AG W/HIV-1&-2 AB AG IA: CPT | Performed by: FAMILY MEDICINE

## 2019-03-30 LAB — HIV 1+2 AB+HIV1 P24 AG SERPL QL IA: NONREACTIVE

## 2019-04-01 ENCOUNTER — OFFICE VISIT (OUTPATIENT)
Dept: FAMILY MEDICINE | Facility: CLINIC | Age: 44
End: 2019-04-01
Payer: COMMERCIAL

## 2019-04-01 VITALS
BODY MASS INDEX: 32.01 KG/M2 | TEMPERATURE: 97.9 F | HEIGHT: 68 IN | WEIGHT: 211.2 LBS | HEART RATE: 82 BPM | SYSTOLIC BLOOD PRESSURE: 133 MMHG | DIASTOLIC BLOOD PRESSURE: 88 MMHG | RESPIRATION RATE: 8 BRPM

## 2019-04-01 DIAGNOSIS — E10.9 TYPE 1 DIABETES MELLITUS WITHOUT COMPLICATION (H): Primary | ICD-10-CM

## 2019-04-01 DIAGNOSIS — L71.9 ROSACEA: ICD-10-CM

## 2019-04-01 PROCEDURE — 99214 OFFICE O/P EST MOD 30 MIN: CPT | Performed by: FAMILY MEDICINE

## 2019-04-01 RX ORDER — CLINDAMYCIN PHOSPHATE 10 MG/G
GEL TOPICAL
Qty: 180 G | Refills: 3 | Status: SHIPPED | OUTPATIENT
Start: 2019-04-01 | End: 2021-10-20

## 2019-04-01 ASSESSMENT — PAIN SCALES - GENERAL: PAINLEVEL: NO PAIN (0)

## 2019-04-01 ASSESSMENT — MIFFLIN-ST. JEOR: SCORE: 1837.5

## 2019-04-01 NOTE — PROGRESS NOTES
SUBJECTIVE:                                                    Jon Ortiz is a 43 year old male who presents to clinic today for the following health issues:    Diabetes Follow-up    Patient is checking blood sugars: 8 times daily.    Blood sugar testing frequency justification: Risk of hypoglycemia with medication(s)  Results are as follows:         The average is around 110    Diabetic concerns: None     Symptoms of hypoglycemia (low blood sugar): none     Paresthesias (numbness or burning in feet) or sores: No     Date of last diabetic eye exam: Has been awhile. He is aware that he is due.     211 lbs 3.2 oz    Wt Readings from Last 10 Encounters:   04/01/19 95.8 kg (211 lb 3.2 oz)   08/13/18 96.5 kg (212 lb 12.8 oz)   07/11/17 90 kg (198 lb 6.4 oz)   03/03/16 86.8 kg (191 lb 4.8 oz)   07/16/14 72.7 kg (160 lb 4.8 oz)   12/17/13 80.8 kg (178 lb 3.2 oz)   11/06/13 78.6 kg (173 lb 4.8 oz)   11/20/12 76.7 kg (169 lb)   02/22/12 72.1 kg (159 lb)   03/02/11 76.7 kg (169 lb)       BP Readings from Last 2 Encounters:   04/01/19 133/88   08/13/18 125/85     Hemoglobin A1C (%)   Date Value   03/29/2019 5.5   07/19/2018 6.1 (H)     LDL Cholesterol Calculated (mg/dL)   Date Value   03/29/2019 86   07/19/2018 124 (H)       Diabetes Management Resources  Hyperlipidemia Follow-Up      Rate your low fat/cholesterol diet?: not monitoring fat    Taking statin?  Yes, no muscle aches from statin    Other lipid medications/supplements?:  none      Amount of exercise or physical activity: 1 day/week for an average of 30-45 minutes    Problems taking medications regularly: No    Medication side effects: none    Diet: regular (no restrictions)      Problem list and histories reviewed & adjusted, as indicated.  Additional history:     Patient Active Problem List   Diagnosis     Contact dermatitis and other eczema, due to unspecified cause     Other acne     CARDIOVASCULAR SCREENING; LDL GOAL LESS THAN 100     [facility]      Type 1 diabetes mellitus without complication (H)     Past Surgical History:   Procedure Laterality Date     C LASIK CONVENTIONAL  04/2008    Bilateral eyes.       Social History     Tobacco Use     Smoking status: Never Smoker     Smokeless tobacco: Never Used   Substance Use Topics     Alcohol use: Yes     Comment: Rarely     Family History   Problem Relation Age of Onset     Allergies Mother      Arthritis Mother      Cancer Mother      Heart Disease Mother      Heart Disease Father      Cancer Father      Cerebrovascular Disease Maternal Grandfather      Alzheimer Disease Maternal Grandfather      Heart Disease Maternal Grandfather      Heart Disease Paternal Grandmother      Alcohol/Drug Paternal Grandmother      Cerebrovascular Disease Paternal Grandfather      Alzheimer Disease Paternal Grandfather          Current Outpatient Medications   Medication Sig Dispense Refill     atorvastatin (LIPITOR) 10 MG tablet Take 1 tablet (10 mg) by mouth daily 90 tablet 3     blood glucose monitoring (NO BRAND SPECIFIED) test strip Test blood sugars 11 times per day. 9 Box 3     clindamycin (CLINDAMAX) 1 % external gel Apply  topically 2 times daily. 180 g 3     HUMALOG 100 UNIT/ML injection USE FOR PUMP AS DIRECTED UP TO 90 UNITS PER DAY 30 mL 1     INSULIN INFUSION PUMP ANNY daily 1 Device 2     multivitamin, therapeutic with minerals (MULTI-VITAMIN) TABS Take 1 tablet by mouth daily       ACE NOT PRESCRIBED, INTENTIONAL, by Other route continuous prn.  0     ACE/ARB NOT PRESCRIBED, INTENTIONAL, Please choose reason not prescribed, below       aspirin 81 MG tablet Take 1 tablet by mouth daily.       STATIN NOT PRESCRIBED, INTENTIONAL, 1 each continuous prn Statin not prescribed intentionally due to Other: LDL < 100 w/o statin 0 each 0     Allergies   Allergen Reactions     Penicillins      ROS:  Constitutional, HEENT, cardiovascular, pulmonary, gi and gu systems are negative, except as otherwise noted.    OBJECTIVE:        "                                             /88 (BP Location: Right arm, Patient Position: Sitting, Cuff Size: Adult Large)   Pulse 82   Temp 97.9  F (36.6  C) (Tympanic)   Resp 8   Ht 1.727 m (5' 8\")   Wt 95.8 kg (211 lb 3.2 oz)   BMI 32.11 kg/m   Body mass index is 32.11 kg/m .     GENERAL: healthy, alert, well nourished, well hydrated, no distress  HENT: ear canals- normal; TMs- normal; Nose- normal; Mouth- no ulcers, no lesions  NECK: no tenderness, no adenopathy, no asymmetry, no masses, no stiffness; thyroid- normal to palpation  RESP: lungs clear to auscultation - no rales, no rhonchi, no wheezes  CV: regular rates and rhythm, normal S1 S2, no S3 or S4 and no murmur, no click or rub -  ABDOMEN: soft, no tenderness, no  hepatosplenomegaly, no masses, normal bowel sounds       ASSESSMENT/PLAN:                                                      (L71.9) Rosacea  Comment: Good control.  Continue currant medications, continue to monito   Plan: clindamycin (CLINDAMAX) 1 % external gel    (E10.9) Type 1 diabetes mellitus without complication (H)  (primary encounter diagnosis)  Good control.  Continue currant medications, continue to monito     (L71.9) Rosacea  Plan: clindamycin (CLINDAMAX) 1 % external gel           reports that  has never smoked. he has never used smokeless tobacco.      [facility]    "

## 2019-05-20 DIAGNOSIS — E10.9 TYPE 1 DIABETES MELLITUS WITHOUT COMPLICATION (H): ICD-10-CM

## 2019-05-21 NOTE — TELEPHONE ENCOUNTER
"HUMALOG VIAL 10ML 100U/ML      Last Written Prescription Date:  3/25/19  Last Fill Quantity: 30,   # refills: 1  Last Office Visit: 4/1/19  Future Office visit:       Requested Prescriptions   Pending Prescriptions Disp Refills     HUMALOG 100 UNIT/ML injection [Pharmacy Med Name: HUMALOG VIAL 10ML 100U/ML] 30 mL 1     Sig: USE FOR PUMP AS DIRECTED UP TO 90 UNITS PER DAY       Short Acting Insulin Protocol Passed - 5/20/2019  8:54 PM        Passed - Blood pressure less than 140/90 in past 6 months     BP Readings from Last 3 Encounters:   04/01/19 133/88   08/13/18 125/85   07/11/17 129/89                 Passed - LDL on file in past 12 months     Recent Labs   Lab Test 03/29/19  1336   LDL 86             Passed - Microalbumin on file in past 12 months     Recent Labs   Lab Test 07/19/18  1422   MICROL <5   UMALCR Unable to calculate due to low value             Passed - Serum creatinine on file in past 12 months     Recent Labs   Lab Test 07/19/18  1415   CR 0.76             Passed - HgbA1C in past 3 or 6 months     If HgbA1C is 8 or greater, it needs to be on file within the past 3 months.  If less than 8, must be on file within the past 6 months.     Recent Labs   Lab Test 03/29/19  1336   A1C 5.5             Passed - Medication is active on med list        Passed - Patient is age 18 or older        Passed - Recent (6 mo) or future (30 days) visit within the authorizing provider's specialty     Patient had office visit in the last 6 months or has a visit in the next 30 days with authorizing provider or within the authorizing provider's specialty.  See \"Patient Info\" tab in inbasket, or \"Choose Columns\" in Meds & Orders section of the refill encounter.              "

## 2019-05-22 NOTE — TELEPHONE ENCOUNTER
Prescription approved per [facility] Refill Protocol.  Yao Goins RN

## 2019-10-11 ENCOUNTER — TELEPHONE (OUTPATIENT)
Dept: FAMILY MEDICINE | Facility: CLINIC | Age: 44
End: 2019-10-11

## 2019-10-11 NOTE — TELEPHONE ENCOUNTER
Panel Management Review      Patient has the following on his problem list:     Diabetes    ASA: Passed    Last A1C  Lab Results   Component Value Date    A1C 5.5 03/29/2019    A1C 6.1 07/19/2018    A1C 5.6 07/11/2017    A1C 5.8 03/03/2016    A1C 5.9 07/16/2014     A1C tested: Passed    Last LDL:    Lab Results   Component Value Date    CHOL 161 03/29/2019     Lab Results   Component Value Date    HDL 68 03/29/2019     Lab Results   Component Value Date    LDL 86 03/29/2019     Lab Results   Component Value Date    TRIG 33 03/29/2019     Lab Results   Component Value Date    CHOLHDLRATIO 2.0 11/06/2013     Lab Results   Component Value Date    NHDL 93 03/29/2019       Is the patient on a Statin? YES             Is the patient on Aspirin? YES    Medications     HMG CoA Reductase Inhibitors     atorvastatin (LIPITOR) 10 MG tablet        STATIN NOT PRESCRIBED, INTENTIONAL,       Salicylates     aspirin 81 MG tablet             Last three blood pressure readings:  BP Readings from Last 3 Encounters:   04/01/19 133/88   08/13/18 125/85   07/11/17 129/89       Date of last diabetes office visit: 04/01/2019     Tobacco History:     History   Smoking Status     Never Smoker   Smokeless Tobacco     Never Used           Composite cancer screening  Chart review shows that this patient is due/due soon for the following None  Summary:    Patient is due/failing the following:   Diabetes, A1C and PHYSICAL    Action needed:   Patient needs office visit for Diabetic check and physical.    Type of outreach:    Phone, left message for patient to call back.     Questions for provider review:    None                                                                                                                                    Emse Welsh CMA       Chart routed to self.

## 2019-12-30 ENCOUNTER — TELEPHONE (OUTPATIENT)
Dept: FAMILY MEDICINE | Facility: CLINIC | Age: 44
End: 2019-12-30

## 2019-12-30 DIAGNOSIS — E10.9 TYPE 1 DIABETES MELLITUS WITHOUT COMPLICATION (H): Primary | ICD-10-CM

## 2020-02-09 DIAGNOSIS — E10.9 TYPE 1 DIABETES MELLITUS WITHOUT COMPLICATION (H): ICD-10-CM

## 2020-02-10 NOTE — TELEPHONE ENCOUNTER
"HUMALOG VIAL 10ML 100U/ML  Last Written Prescription Date:  5/22/2019  Last Fill Quantity: 90,  # refills: 2   Last office visit: 4/1/2019 with prescribing provider:  karlene   Future Office Visit:    Requested Prescriptions   Pending Prescriptions Disp Refills     HUMALOG 100 UNIT/ML injection [Pharmacy Med Name: HUMALOG VIAL 10ML 100U/ML] 90 mL 3     Sig: USE FOR PUMP AS DIRECTED, UP TO 90 UNITS PER DAY       Short Acting Insulin Protocol Failed - 2/9/2020 10:09 PM        Failed - Blood pressure less than 140/90 in past 6 months     BP Readings from Last 3 Encounters:   04/01/19 133/88   08/13/18 125/85   07/11/17 129/89                 Failed - Microalbumin on file in past 12 months     Recent Labs   Lab Test 07/19/18  1422   MICROL <5   UMALCR Unable to calculate due to low value             Failed - Serum creatinine on file in past 12 months     Recent Labs   Lab Test 07/19/18  1415   CR 0.76             Failed - HgbA1C in past 3 or 6 months     If HgbA1C is 8 or greater, it needs to be on file within the past 3 months.  If less than 8, must be on file within the past 6 months.     Recent Labs   Lab Test 03/29/19  1336   A1C 5.5             Failed - Recent (6 mo) or future (30 days) visit within the authorizing provider's specialty     Patient had office visit in the last 6 months or has a visit in the next 30 days with authorizing provider or within the authorizing provider's specialty.  See \"Patient Info\" tab in inbasket, or \"Choose Columns\" in Meds & Orders section of the refill encounter.            Passed - LDL on file in past 12 months     Recent Labs   Lab Test 03/29/19  1336   LDL 86             Passed - Medication is active on med list        Passed - Patient is age 18 or older          "

## 2020-02-11 NOTE — TELEPHONE ENCOUNTER
Routing refill request to provider for review/approval because:  Labs not current:   Hannah Barnard RN

## 2020-04-29 DIAGNOSIS — E10.9 TYPE 1 DIABETES MELLITUS WITHOUT COMPLICATION (H): ICD-10-CM

## 2020-04-30 NOTE — TELEPHONE ENCOUNTER
Please call and schedule him for a visit  Not seen in over a year and has not had labs. Will send in 1 month Carmen Marc M.D.'

## 2020-04-30 NOTE — TELEPHONE ENCOUNTER
Tried to call Jon and he is no longer at that number and person that answer said he doesn't know other number for Jon.  Will send letter.      ..Carmen Ferrell

## 2020-05-05 ENCOUNTER — TELEPHONE (OUTPATIENT)
Dept: FAMILY MEDICINE | Facility: CLINIC | Age: 45
End: 2020-05-05

## 2020-05-05 DIAGNOSIS — E10.9 TYPE 1 DIABETES MELLITUS WITHOUT COMPLICATION (H): Primary | ICD-10-CM

## 2020-05-05 DIAGNOSIS — E10.9 TYPE 1 DIABETES MELLITUS WITHOUT COMPLICATION (H): ICD-10-CM

## 2020-05-05 NOTE — TELEPHONE ENCOUNTER
Reason for call:  Other   Patient called regarding (reason for call): call back  Additional comments: Patient is requesting for his diabetic check lab orders be entered in so he can have them done prior to his virtual visit with Dr. Drew    Phone number to reach patient:  Home number on file [phone] (home)    Best Time:  any    Can we leave a detailed message on this number?  YES    Travel screening: Negative

## 2020-05-07 DIAGNOSIS — E10.9 TYPE 1 DIABETES MELLITUS WITHOUT COMPLICATION (H): ICD-10-CM

## 2020-05-07 LAB — HBA1C MFR BLD: 5.5 % (ref 0–5.6)

## 2020-05-07 PROCEDURE — 36415 COLL VENOUS BLD VENIPUNCTURE: CPT | Performed by: FAMILY MEDICINE

## 2020-05-07 PROCEDURE — 82043 UR ALBUMIN QUANTITATIVE: CPT | Performed by: FAMILY MEDICINE

## 2020-05-07 PROCEDURE — 80061 LIPID PANEL: CPT | Performed by: FAMILY MEDICINE

## 2020-05-07 PROCEDURE — 83036 HEMOGLOBIN GLYCOSYLATED A1C: CPT | Performed by: FAMILY MEDICINE

## 2020-05-07 PROCEDURE — 80048 BASIC METABOLIC PNL TOTAL CA: CPT | Performed by: FAMILY MEDICINE

## 2020-05-07 NOTE — TELEPHONE ENCOUNTER
Routing refill request to provider for review/approval because:  Was du for visit in October, last seen 4.1.2019  Lucia Wakefield RN

## 2020-05-07 NOTE — TELEPHONE ENCOUNTER
Pt called and stated that he is going to run out of insulin and was told by [facility] that they have been trying to get a hold of us for approval? So now he would like to  his insulin needs 2 wks worth at [facility] in [place]. Pt did labs today and has a video visit scheduled with Dr. Marc on 5/13/20.p Pls call patient to advise.

## 2020-05-08 LAB
ANION GAP SERPL CALCULATED.3IONS-SCNC: 6 MMOL/L (ref 3–14)
BUN SERPL-MCNC: 9 MG/DL (ref 7–30)
CALCIUM SERPL-MCNC: 8.3 MG/DL (ref 8.5–10.1)
CHLORIDE SERPL-SCNC: 102 MMOL/L (ref 94–109)
CHOLEST SERPL-MCNC: 175 MG/DL
CO2 SERPL-SCNC: 26 MMOL/L (ref 20–32)
CREAT SERPL-MCNC: 0.78 MG/DL (ref 0.66–1.25)
CREAT UR-MCNC: 17 MG/DL
GFR SERPL CREATININE-BSD FRML MDRD: >90 ML/MIN/{1.73_M2}
GLUCOSE SERPL-MCNC: 348 MG/DL (ref 70–99)
HDLC SERPL-MCNC: 65 MG/DL
LDLC SERPL CALC-MCNC: 94 MG/DL
MICROALBUMIN UR-MCNC: <5 MG/L
MICROALBUMIN/CREAT UR: NORMAL MG/G CR (ref 0–17)
NONHDLC SERPL-MCNC: 110 MG/DL
POTASSIUM SERPL-SCNC: 4.6 MMOL/L (ref 3.4–5.3)
SODIUM SERPL-SCNC: 134 MMOL/L (ref 133–144)
TRIGL SERPL-MCNC: 79 MG/DL

## 2020-05-11 DIAGNOSIS — E10.9 TYPE 1 DIABETES MELLITUS WITHOUT COMPLICATION (H): ICD-10-CM

## 2020-05-13 ENCOUNTER — VIRTUAL VISIT (OUTPATIENT)
Dept: FAMILY MEDICINE | Facility: CLINIC | Age: 45
End: 2020-05-13
Payer: COMMERCIAL

## 2020-05-13 DIAGNOSIS — E10.9 TYPE 1 DIABETES MELLITUS WITHOUT COMPLICATION (H): ICD-10-CM

## 2020-05-13 DIAGNOSIS — E78.5 HYPERLIPIDEMIA LDL GOAL <100: ICD-10-CM

## 2020-05-13 PROCEDURE — 99214 OFFICE O/P EST MOD 30 MIN: CPT | Mod: 95 | Performed by: FAMILY MEDICINE

## 2020-05-13 RX ORDER — ATORVASTATIN CALCIUM 10 MG/1
10 TABLET, FILM COATED ORAL DAILY
Qty: 90 TABLET | Refills: 3 | Status: SHIPPED | OUTPATIENT
Start: 2020-05-13 | End: 2021-10-20

## 2020-05-13 NOTE — PROGRESS NOTES
"Zak Ortzi is a 44 year old male who is being evaluated via a billable video visit.      The patient has been notified of following:     \"This video visit will be conducted via a call between you and your physician/provider. We have found that certain health care needs can be provided without the need for an in-person physical exam.  This service lets us provide the care you need with a video conversation.  If a prescription is necessary we can send it directly to your pharmacy.  If lab work is needed we can place an order for that and you can then stop by our lab to have the test done at a later time.    Video visits are billed at different rates depending on your insurance coverage.  Please reach out to your insurance provider with any questions.    If during the course of the call the physician/provider feels a video visit is not appropriate, you will not be charged for this service.\"    Patient has given verbal consent for Video visit? Yes    How would you like to obtain your AVS? [facility]    Patient would like the video invitation sent by: Text to cell phone: [phone]    Will anyone else be joining your video visit? No    Subjective     Zak Ortiz is a 44 year old male who presents today via video visit for the following health issues:    Chief Complaint   Patient presents with     Video Visit     [phone]     Diabetes       HPI     Diabetes Follow-up      How often are you checking your blood sugar?  120 average checking before and after. About 8 times per day.     What concerns do you have today about your diabetes? None     Do you have any of these symptoms? (Select all that apply)  No numbness or tingling in feet.  No redness, sores or blisters on feet.  No complaints of excessive thirst.  No reports of blurry vision.  No significant changes to weight.    Have you had a diabetic eye exam in the last 12 months? No        BP Readings from Last 2 Encounters:   04/01/19 133/88   08/13/18 " 125/85     Hemoglobin A1C (%)   Date Value   05/07/2020 5.5   03/29/2019 5.5     LDL Cholesterol Calculated (mg/dL)   Date Value   05/07/2020 94   03/29/2019 86       Has a glucose monitor that he can get free tests strips no lows ave about 120  Pump is fucntioning well   Hyperlipidemia Follow-Up    Are you regularly taking any medication or supplement to lower your cholesterol?   Yes-      Are you having muscle aches or other side effects that you think could be caused by your cholesterol lowering medication?  No      No     Video Start Time: 323        Patient Active Problem List   Diagnosis     Contact dermatitis and other eczema, due to unspecified cause     Other acne     CARDIOVASCULAR SCREENING; LDL GOAL LESS THAN 100     Health Care Home     Type 1 diabetes mellitus without complication (H)     Past Surgical History:   Procedure Laterality Date     C LASIK CONVENTIONAL  04/2008    Bilateral eyes.       Social History     Tobacco Use     Smoking status: Never Smoker     Smokeless tobacco: Never Used   Substance Use Topics     Alcohol use: Yes     Comment: Rarely     Family History   Problem Relation Age of Onset     Allergies Mother      Arthritis Mother      Cancer Mother      Heart Disease Mother      Heart Disease Father      Cancer Father      Cerebrovascular Disease Maternal Grandfather      Alzheimer Disease Maternal Grandfather      Heart Disease Maternal Grandfather      Heart Disease Paternal Grandmother      Alcohol/Drug Paternal Grandmother      Cerebrovascular Disease Paternal Grandfather      Alzheimer Disease Paternal Grandfather            Reviewed and updated as needed this visit by Provider       Due for eye exam and tetanus shot   Denies any open wound   No sores on feet   No chest pain  No shortness of breath   Weight is steady.   No complaint s  Review of Systems   Constitutional, HEENT, cardiovascular, pulmonary, GI, , musculoskeletal, neuro, skin, endocrine and psych systems are  "negative, except as otherwise noted.      Objective    There were no vitals taken for this visit.  Estimated body mass index is 32.11 kg/m  as calculated from the following:    Height as of 4/1/19: 1.727 m (5' 8\").    Weight as of 4/1/19: 95.8 kg (211 lb 3.2 oz).  Physical Exam     GENERAL: Healthy, alert and no distress  EYES: Eyes grossly normal to inspection.  No discharge or erythema, or obvious scleral/conjunctival abnormalities.  RESP: No audible wheeze, cough, or visible cyanosis.  No visible retractions or increased work of breathing.    SKIN: Visible skin clear. No significant rash, abnormal pigmentation or lesions.  NEURO: Cranial nerves grossly intact.  Mentation and speech appropriate for age.  PSYCH: Mentation appears normal, affect normal/bright, judgement and insight intact, normal speech and appearance well-groomed.      Diagnostic Test Results:  Labs reviewed in Epic  reveiwed labs he has excellent control          Assessment & Plan     1. Hyperlipidemia LDL goal <100  tolerateing well   - atorvastatin (LIPITOR) 10 MG tablet; Take 1 tablet (10 mg) by mouth daily  Dispense: 90 tablet; Refill: 3    2. Type 1 diabetes mellitus without complication (H)  Excellent control uses up to 90 unit(s) per day will refill for the year   - insulin lispro (HUMALOG) 100 UNIT/ML vial; USE FOR PUMP AS DIRECTED, UP TO 90 UNITS PER DAY  Dispense: 90 mL; Refill: 3           Return in about 6 months (around 11/13/2020) for med check.    Marysol Marc MD  [facility]      Video-Visit Details    Type of service:  Video Visit    Video End Time:3:29 PM    Originating Location (pt. Location): Home    Distant Location (provider location):  [facility]     Platform used for Video Visit: Doximity    No follow-ups on file.       Marysol Marc MD      "

## 2020-05-31 DIAGNOSIS — E10.9 TYPE 1 DIABETES MELLITUS WITHOUT COMPLICATION (H): ICD-10-CM

## 2020-06-01 NOTE — TELEPHONE ENCOUNTER
Routing refill request to provider for review/approval because:  Would like Provider to decide if original order or alternative order should be sent.  Thank you.  Yao Goins RN

## 2020-09-14 ENCOUNTER — VIRTUAL VISIT (OUTPATIENT)
Dept: FAMILY MEDICINE | Facility: CLINIC | Age: 45
End: 2020-09-14
Payer: COMMERCIAL

## 2020-09-14 DIAGNOSIS — E11.649 HYPOGLYCEMIA ASSOCIATED WITH DIABETES (H): ICD-10-CM

## 2020-09-14 DIAGNOSIS — E10.9 TYPE 1 DIABETES MELLITUS WITHOUT COMPLICATION (H): Primary | ICD-10-CM

## 2020-09-14 PROCEDURE — 99214 OFFICE O/P EST MOD 30 MIN: CPT | Mod: 95 | Performed by: FAMILY MEDICINE

## 2020-09-14 RX ORDER — GLUCAGON HYDROCHLORIDE 1 MG
1 KIT INJECTION ONCE
Qty: 2 MG | Refills: 3 | Status: SHIPPED | OUTPATIENT
Start: 2020-09-14 | End: 2022-10-04

## 2020-09-14 NOTE — PROGRESS NOTES
"Zak Ortiz is a 45 year old male who is being evaluated via a billable video visit.      The patient has been notified of following:     \"This video visit will be conducted via a call between you and your physician/provider. We have found that certain health care needs can be provided without the need for an in-person physical exam.  This service lets us provide the care you need with a video conversation.  If a prescription is necessary we can send it directly to your pharmacy.  If lab work is needed we can place an order for that and you can then stop by our lab to have the test done at a later time.    Video visits are billed at different rates depending on your insurance coverage.  Please reach out to your insurance provider with any questions.    If during the course of the call the physician/provider feels a video visit is not appropriate, you will not be charged for this service.\"    Patient has given verbal consent for Video visit? Yes  How would you like to obtain your AVS? MyChart  If you are dropped from the video visit, the video invite should be resent to: Text to cell phone:   [phone]  Will anyone else be joining your video visit? No    Subjective     Zak Ortiz is a 45 year old male who presents today via video visit for the following health issues:    HPI  2:55 PM start video    I would like to get a continuous glucose monitor and GlucaGen injection, in the   case of extremely low blood sugars.    Reviewed last labs from the spring they were good       Video Start Time: 2:55 PM  Had to convert to telephone   He did have a low blood glucose and needed the emts  woul dlike the glucagon pen so he can avoid this happening again   Blood sugar control has been good   He would like dexcom for the meter but it will likely depend in his insurance company's preferance.     Review of Systems   Constitutional, HEENT, cardiovascular, pulmonary, gi and gu systems are negative, except as otherwise " noted.      Objective           Vitals:  No vitals were obtained today due to virtual visit.    Physical Exam     GENERAL: Healthy, alert and no distress  RESP: No audible wheeze, cough, or visible cyanosis.  No visible retractions or increased work of breathing.    PSYCH: Mentation appears normal, affect normal/bright, judgement and insight intact, normal speech and appearance well-groomed.      Orders Only on 05/07/2020   Component Date Value Ref Range Status     Sodium 05/07/2020 134  133 - 144 mmol/L Final     Potassium 05/07/2020 4.6  3.4 - 5.3 mmol/L Final     Chloride 05/07/2020 102  94 - 109 mmol/L Final     Carbon Dioxide 05/07/2020 26  20 - 32 mmol/L Final     Anion Gap 05/07/2020 6  3 - 14 mmol/L Final     Glucose 05/07/2020 348* 70 - 99 mg/dL Final    Fasting specimen     Urea Nitrogen 05/07/2020 9  7 - 30 mg/dL Final     Creatinine 05/07/2020 0.78  0.66 - 1.25 mg/dL Final     GFR Estimate 05/07/2020 >90  >60 mL/min/[1.73_m2] Final    Comment: Non  GFR Calc  Starting 12/18/2018, serum creatinine based estimated GFR (eGFR) will be   calculated using the Chronic Kidney Disease Epidemiology Collaboration   (CKD-EPI) equation.       GFR Estimate If Black 05/07/2020 >90  >60 mL/min/[1.73_m2] Final    Comment:  GFR Calc  Starting 12/18/2018, serum creatinine based estimated GFR (eGFR) will be   calculated using the Chronic Kidney Disease Epidemiology Collaboration   (CKD-EPI) equation.       Calcium 05/07/2020 8.3* 8.5 - 10.1 mg/dL Final     Cholesterol 05/07/2020 175  <200 mg/dL Final     Triglycerides 05/07/2020 79  <150 mg/dL Final    Fasting specimen     HDL Cholesterol 05/07/2020 65  >39 mg/dL Final     LDL Cholesterol Calculated 05/07/2020 94  <100 mg/dL Final    Desirable:       <100 mg/dl     Non HDL Cholesterol 05/07/2020 110  <130 mg/dL Final     Hemoglobin A1C 05/07/2020 5.5  0 - 5.6 % Final    Comment: Normal <5.7% Prediabetes 5.7-6.4%  Diabetes 6.5% or higher -  adopted from ADA   consensus guidelines.       Creatinine Urine 05/07/2020 17  mg/dL Final     Albumin Urine mg/L 05/07/2020 <5  mg/L Final     Albumin Urine mg/g Cr 05/07/2020 Unable to calculate due to low value  0 - 17 mg/g Cr Final           Assessment & Plan     Type 1 diabetes mellitus without complication (H)  Will order and send in order when he sends me where to send this to   1. Type 1 diabetes mellitus without complication (H)  - Continuous Blood Gluc  (DEXCOM G6 ) ANNY; 1 each once for 1 dose Use to read blood sugars as per 's instructions.  Dispense: 1 Device; Refill: 0  - Continuous Blood Gluc Transmit (DEXCOM G6 TRANSMITTER) MISC; 1 each every 3 months Change every 3 months.  Dispense: 1 each; Refill: 3  - Continuous Blood Gluc Sensor (DEXCOM G6 SENSOR) MISC; 1 each every 10 days Change every 10 days.  Dispense: 3 each; Refill: 11    2. Hypoglycemia associated with diabetes (H)  Will send 2 with refills so that he can have them at work and home   - glucagon (GLUCAGEN HYPOKIT) 1 MG SOLR injection; Inject 1 mg Subcutaneous once for 1 dose For symptomatic low blood sugar  Dispense: 2 mg; Refill: 3             3 month s    No follow-ups on file.    Marysol Marc MD  [facility]      Video-Visit Details    Type of service: telephone    Telephone length 13 minutes     Originating Location (pt. Location): Other driving to work     Distant Location (provider location):  [facility]     Platform used for Video Visit: Unable to complete video visit

## 2020-09-15 RX ORDER — PROCHLORPERAZINE 25 MG/1
1 SUPPOSITORY RECTAL
Qty: 3 EACH | Refills: 11 | Status: SHIPPED | OUTPATIENT
Start: 2020-09-15 | End: 2021-10-20

## 2020-09-15 RX ORDER — PROCHLORPERAZINE 25 MG/1
1 SUPPOSITORY RECTAL ONCE
Qty: 1 DEVICE | Refills: 0 | Status: SHIPPED | OUTPATIENT
Start: 2020-09-15 | End: 2020-09-15

## 2020-09-15 RX ORDER — PROCHLORPERAZINE 25 MG/1
1 SUPPOSITORY RECTAL
Qty: 1 EACH | Refills: 3 | Status: SHIPPED | OUTPATIENT
Start: 2020-09-15 | End: 2021-10-20

## 2020-09-15 NOTE — PATIENT INSTRUCTIONS
I sent in the dexcom to your pharmacy  There is a nasal inhaler for the glucagon I still thing the injection is  More reliable so I did sent that in with multiple refills   Recheck in 3 months

## 2020-09-16 ENCOUNTER — TELEPHONE (OUTPATIENT)
Dept: FAMILY MEDICINE | Facility: CLINIC | Age: 45
End: 2020-09-16

## 2020-09-16 NOTE — TELEPHONE ENCOUNTER
Reason for Call:  Form, our goal is to have forms completed with 72 hours, however, some forms may require a visit or additional information.    Type of letter, form or note:  medical - Dexcom - certificate of medical necessity    Who is the form from?: [employer] (if other please explain)    Where did the form come from: form was faxed in    What clinic location was the form placed at?: [facility]     Where the form was placed: Given to physician / [place]    What number is listed as a contact on the form?: [phone]    Call taken on 9/16/2020 at 8:35 AM by Carmen Ferrell

## 2020-09-28 ENCOUNTER — TELEPHONE (OUTPATIENT)
Dept: FAMILY MEDICINE | Facility: CLINIC | Age: 45
End: 2020-09-28

## 2020-09-28 NOTE — TELEPHONE ENCOUNTER
[facility], supplies form signed and faxed to: [phone] and sent to scanning.    Elli Arreola  [facility]

## 2020-11-16 ENCOUNTER — HEALTH MAINTENANCE LETTER (OUTPATIENT)
Age: 45
End: 2020-11-16

## 2021-05-29 ENCOUNTER — HEALTH MAINTENANCE LETTER (OUTPATIENT)
Age: 46
End: 2021-05-29

## 2021-08-04 DIAGNOSIS — E10.9 TYPE 1 DIABETES MELLITUS WITHOUT COMPLICATION (H): ICD-10-CM

## 2021-08-04 NOTE — TELEPHONE ENCOUNTER
Medication is being filled for 1 time refill only due to:  Needs fasting lab and MD appointment.  Yao Goins RN

## 2021-08-16 ENCOUNTER — TELEPHONE (OUTPATIENT)
Dept: FAMILY MEDICINE | Facility: CLINIC | Age: 46
End: 2021-08-16

## 2021-08-16 NOTE — TELEPHONE ENCOUNTER
Panel Management Review      Patient has the following on his problem list:     Diabetes    ASA: Passed    Last A1C  Lab Results   Component Value Date    A1C 5.5 05/07/2020    A1C 5.5 03/29/2019    A1C 6.1 07/19/2018    A1C 5.6 07/11/2017    A1C 5.8 03/03/2016     A1C tested: Passed but overdue    Last LDL:    Lab Results   Component Value Date    CHOL 175 05/07/2020     Lab Results   Component Value Date    HDL 65 05/07/2020     Lab Results   Component Value Date    LDL 94 05/07/2020     Lab Results   Component Value Date    TRIG 79 05/07/2020     Lab Results   Component Value Date    CHOLHDLRATIO 2.0 11/06/2013     Lab Results   Component Value Date    NHDL 110 05/07/2020       Is the patient on a Statin? YES             Is the patient on Aspirin? YES    Medications     HMG CoA Reductase Inhibitors     atorvastatin (LIPITOR) 10 MG tablet       Salicylates     aspirin 81 MG tablet             Last three blood pressure readings:  BP Readings from Last 3 Encounters:   04/01/19 133/88   08/13/18 125/85   07/11/17 129/89       Date of last diabetes office visit: 09/14/20     Tobacco History:     History   Smoking Status     Never Smoker   Smokeless Tobacco     Never Used           Composite cancer screening  Chart review shows that this patient is due/due soon for the following None  Summary:    Patient is due/failing the following:   ACP, eye exam, foot exam, vaccines, Hep C screen, BMP, PHQ2, lipids, microalbumin, A1C and PHYSICAL    Action needed:   Patient needs office visit for physical and diabetes.    Type of outreach:    Sent Mojostreet message.    Questions for provider review:    None                                                                                                                                    Tricia Perales CMA       Chart routed to none .

## 2021-09-12 ENCOUNTER — HEALTH MAINTENANCE LETTER (OUTPATIENT)
Age: 46
End: 2021-09-12

## 2021-10-01 ENCOUNTER — LAB (OUTPATIENT)
Dept: LAB | Facility: CLINIC | Age: 46
End: 2021-10-01
Payer: COMMERCIAL

## 2021-10-01 DIAGNOSIS — E10.9 TYPE 1 DIABETES MELLITUS WITHOUT COMPLICATION (H): ICD-10-CM

## 2021-10-01 LAB — HBA1C MFR BLD: 5.1 % (ref 0–5.6)

## 2021-10-01 PROCEDURE — 80048 BASIC METABOLIC PNL TOTAL CA: CPT

## 2021-10-01 PROCEDURE — 82043 UR ALBUMIN QUANTITATIVE: CPT

## 2021-10-01 PROCEDURE — 80061 LIPID PANEL: CPT

## 2021-10-01 PROCEDURE — 36415 COLL VENOUS BLD VENIPUNCTURE: CPT

## 2021-10-01 PROCEDURE — 83036 HEMOGLOBIN GLYCOSYLATED A1C: CPT

## 2021-10-02 LAB
ANION GAP SERPL CALCULATED.3IONS-SCNC: 8 MMOL/L (ref 3–14)
BUN SERPL-MCNC: 10 MG/DL (ref 7–30)
CALCIUM SERPL-MCNC: 8.8 MG/DL (ref 8.5–10.1)
CHLORIDE BLD-SCNC: 106 MMOL/L (ref 94–109)
CHOLEST SERPL-MCNC: 185 MG/DL
CO2 SERPL-SCNC: 23 MMOL/L (ref 20–32)
CREAT SERPL-MCNC: 0.78 MG/DL (ref 0.66–1.25)
CREAT UR-MCNC: 17 MG/DL
FASTING STATUS PATIENT QL REPORTED: YES
GFR SERPL CREATININE-BSD FRML MDRD: >90 ML/MIN/1.73M2
GLUCOSE BLD-MCNC: 61 MG/DL (ref 70–99)
HDLC SERPL-MCNC: 92 MG/DL
LDLC SERPL CALC-MCNC: 88 MG/DL
MICROALBUMIN UR-MCNC: <5 MG/L
MICROALBUMIN/CREAT UR: NORMAL MG/G{CREAT}
NONHDLC SERPL-MCNC: 93 MG/DL
POTASSIUM BLD-SCNC: 3.5 MMOL/L (ref 3.4–5.3)
SODIUM SERPL-SCNC: 137 MMOL/L (ref 133–144)
TRIGL SERPL-MCNC: 27 MG/DL

## 2021-10-20 ENCOUNTER — OFFICE VISIT (OUTPATIENT)
Dept: FAMILY MEDICINE | Facility: CLINIC | Age: 46
End: 2021-10-20
Payer: COMMERCIAL

## 2021-10-20 VITALS
HEART RATE: 86 BPM | BODY MASS INDEX: 31.52 KG/M2 | HEIGHT: 68 IN | TEMPERATURE: 97.9 F | WEIGHT: 208 LBS | SYSTOLIC BLOOD PRESSURE: 126 MMHG | OXYGEN SATURATION: 98 % | DIASTOLIC BLOOD PRESSURE: 88 MMHG

## 2021-10-20 DIAGNOSIS — L71.9 ROSACEA: ICD-10-CM

## 2021-10-20 DIAGNOSIS — D23.9 DERMATOFIBROMA: Primary | ICD-10-CM

## 2021-10-20 DIAGNOSIS — E10.9 TYPE 1 DIABETES MELLITUS WITHOUT COMPLICATION (H): ICD-10-CM

## 2021-10-20 DIAGNOSIS — E78.5 HYPERLIPIDEMIA LDL GOAL <100: ICD-10-CM

## 2021-10-20 PROCEDURE — 99207 PR FOOT EXAM NO CHARGE: CPT | Performed by: FAMILY MEDICINE

## 2021-10-20 PROCEDURE — 99214 OFFICE O/P EST MOD 30 MIN: CPT | Performed by: FAMILY MEDICINE

## 2021-10-20 RX ORDER — PROCHLORPERAZINE 25 MG/1
1 SUPPOSITORY RECTAL
Qty: 1 EACH | Refills: 3 | Status: SHIPPED | OUTPATIENT
Start: 2021-10-20 | End: 2022-10-04

## 2021-10-20 RX ORDER — IBUPROFEN 600 MG/1
1 TABLET ORAL SEE ADMIN INSTRUCTIONS
Qty: 1 KIT | Refills: 1 | Status: SHIPPED | OUTPATIENT
Start: 2021-10-20 | End: 2022-10-04

## 2021-10-20 RX ORDER — CLINDAMYCIN PHOSPHATE 10 MG/G
GEL TOPICAL
Qty: 60 G | Refills: 3 | Status: SHIPPED | OUTPATIENT
Start: 2021-10-20 | End: 2021-10-20

## 2021-10-20 RX ORDER — CLINDAMYCIN PHOSPHATE 10 MG/G
GEL TOPICAL
Qty: 60 G | Refills: 3 | Status: SHIPPED | OUTPATIENT
Start: 2021-10-20 | End: 2022-02-23

## 2021-10-20 RX ORDER — PROCHLORPERAZINE 25 MG/1
1 SUPPOSITORY RECTAL
Qty: 3 EACH | Refills: 11 | Status: SHIPPED | OUTPATIENT
Start: 2021-10-20 | End: 2022-10-04

## 2021-10-20 RX ORDER — ATORVASTATIN CALCIUM 10 MG/1
10 TABLET, FILM COATED ORAL DAILY
Qty: 90 TABLET | Refills: 3 | Status: SHIPPED | OUTPATIENT
Start: 2021-10-20 | End: 2022-10-04

## 2021-10-20 ASSESSMENT — MIFFLIN-ST. JEOR: SCORE: 1807.98

## 2021-10-20 NOTE — PROGRESS NOTES
"  Assessment & Plan     Hyperlipidemia LDL goal <100  Cont well controlled  - atorvastatin (LIPITOR) 10 MG tablet; Take 1 tablet (10 mg) by mouth daily    Rosacea  Controls this well  - clindamycin (CLINDAMAX) 1 % external gel; Apply  topically 2 times daily.    Type 1 diabetes mellitus without complication (H)  Needs new pump and all of the stuff that goes with it   - Continuous Blood Gluc Sensor (DEXCOM G6 SENSOR) MISC; 1 each every 10 days Change every 10 days.  - Continuous Blood Gluc Transmit (DEXCOM G6 TRANSMITTER) MISC; 1 each every 3 months Change every 3 months.  - insulin infusion pump ANNY; daily  - FOOT EXAM  - AMB Adult Diabetes Educator Referral; Future  - glucagon 1 MG kit; Inject 1 mg Subcutaneous once for 1 dose  - Adult Eye Referral; Future  - insulin lispro (HUMALOG VIAL) 100 UNIT/ML vial; Use up to 90 units per day in insulin pump  - Glucagon, rDNA, (GLUCAGON EMERGENCY) 1 MG KIT; Inject 1 Dose as directed See Admin Instructions    Dermatofibroma  Reassurance          BMI:   Estimated body mass index is 31.63 kg/m  as calculated from the following:    Height as of this encounter: 1.727 m (5' 8\").    Weight as of this encounter: 94.3 kg (208 lb).   Weight management plan: Discussed healthy diet and exercise guidelines    Work on weight loss    No follow-ups on file.    Marysol Marc MD  [facility] ISHA Webb is a 46 year old who presents for the following health issues:     HPI     Diabetes Follow-up  How often are you checking your blood sugar? Continuous glucose monitor  What time of day are you checking your blood sugars (select all that apply)?  Not applicable  Have you had any blood sugars above 200?  Yes occasionally - not often   Have you had any blood sugars below 70?  Yes occasionally - not often    What symptoms do you notice when your blood sugar is low?  Shaky, Dizzy and Weak    What concerns do you have today about your diabetes? None     Do you have " "any of these symptoms? (Select all that apply)  No numbness or tingling in feet.  No redness, sores or blisters on feet.  No complaints of excessive thirst.  No reports of blurry vision.  No significant changes to weight.    Have you had a diabetic eye exam in the last 12 months? No    BP Readings from Last 2 Encounters:   10/20/21 126/88   04/01/19 133/88     Hemoglobin A1C (%)   Date Value   10/01/2021 5.1   05/07/2020 5.5   03/29/2019 5.5     LDL Cholesterol Calculated (mg/dL)   Date Value   10/01/2021 88   05/07/2020 94   03/29/2019 86               Review of Systems   Constitutional, HEENT, cardiovascular, pulmonary, gi and gu systems are negative, except as otherwise noted.      Objective    /88   Pulse 86   Temp 97.9  F (36.6  C) (Tympanic)   Ht 1.727 m (5' 8\")   Wt 94.3 kg (208 lb)   SpO2 98%   BMI 31.63 kg/m    Body mass index is 31.63 kg/m .  Physical Exam   GENERAL: healthy, alert and no distress  EYES: Eyes grossly normal to inspection, PERRL and conjunctivae and sclerae normal  NECK: no adenopathy, no asymmetry, masses, or scars and thyroid normal to palpation  RESP: lungs clear to auscultation - no rales, rhonchi or wheezes  CV: regular rate and rhythm, normal S1 S2, no S3 or S4, no murmur, click or rub, no peripheral edema and peripheral pulses strong  MS: no gross musculoskeletal defects noted, no edema  Diabetic foot exam: normal DP and PT pulses, no trophic changes or ulcerative lesions and normal sensory exam    No results found for any visits on 10/20/21.    Labs from 10/1 were reviewed   Marysol Marc M.D.          "

## 2021-10-21 ENCOUNTER — TELEPHONE (OUTPATIENT)
Dept: FAMILY MEDICINE | Facility: CLINIC | Age: 46
End: 2021-10-21
Payer: COMMERCIAL

## 2021-10-21 NOTE — TELEPHONE ENCOUNTER
Diabetes Education Scheduling Outreach #1:    Call to patient to schedule. Patient declined to schedule.    Plan for 2nd outreach attempt within N/A     Lucie Rios  [facility]  Diabetes and Nutrition Scheduling

## 2021-10-25 ENCOUNTER — TELEPHONE (OUTPATIENT)
Dept: FAMILY MEDICINE | Facility: CLINIC | Age: 46
End: 2021-10-25

## 2021-10-25 NOTE — TELEPHONE ENCOUNTER
You placed an external eye referral on 10/20/21 for patient.  Do you recall where you are sending him?  This is needed to complete referral.   Thank you!  Sri/[facility] Referrals

## 2021-10-26 NOTE — TELEPHONE ENCOUNTER
Patient states he doesn't see one and isn't going to at this time.    Evelia Thakur, HOMERO Marquis

## 2021-11-07 DIAGNOSIS — E10.9 TYPE 1 DIABETES MELLITUS WITHOUT COMPLICATION (H): ICD-10-CM

## 2022-01-02 ENCOUNTER — HEALTH MAINTENANCE LETTER (OUTPATIENT)
Age: 47
End: 2022-01-02

## 2022-02-22 DIAGNOSIS — L71.9 ROSACEA: ICD-10-CM

## 2022-02-23 RX ORDER — CLINDAMYCIN PHOSPHATE 10 MG/G
GEL TOPICAL
Qty: 60 G | Refills: 1 | Status: SHIPPED | OUTPATIENT
Start: 2022-02-23 | End: 2022-08-28

## 2022-02-23 NOTE — TELEPHONE ENCOUNTER
Reason for Call:  Medication or medication refill:    Do you use a [facility]?  Name of the pharmacy and phone number for the current request:  [facility] - [place], [place] - [address] [place] [address]    Name of the medication requested: clindamycin (CLINDAMAX) 1 % external gel    Other request: NA    Can we leave a detailed message on this number? YES    Phone number patient can be reached at: Home number on file [phone] (home)    Best Time: ANY    Call taken on 2/22/2022 at 9:33 PM by Denae Hua

## 2022-04-24 ENCOUNTER — HEALTH MAINTENANCE LETTER (OUTPATIENT)
Age: 47
End: 2022-04-24

## 2022-08-26 DIAGNOSIS — L71.9 ROSACEA: ICD-10-CM

## 2022-08-26 NOTE — TELEPHONE ENCOUNTER
Routing refill request to provider for review/approval because:  PCP to determine refill   Has upcoming appointment     Ozzie Carbajal RN

## 2022-08-28 RX ORDER — CLINDAMYCIN PHOSPHATE 10 MG/G
GEL TOPICAL
Qty: 60 G | Refills: 11 | Status: SHIPPED | OUTPATIENT
Start: 2022-08-28 | End: 2023-11-30

## 2022-09-09 ENCOUNTER — LAB (OUTPATIENT)
Dept: LAB | Facility: CLINIC | Age: 47
End: 2022-09-09
Payer: COMMERCIAL

## 2022-09-09 DIAGNOSIS — E10.9 TYPE 1 DIABETES MELLITUS WITHOUT COMPLICATION (H): ICD-10-CM

## 2022-09-09 DIAGNOSIS — E78.5 HYPERLIPIDEMIA LDL GOAL <100: ICD-10-CM

## 2022-09-09 LAB
ALBUMIN SERPL BCG-MCNC: 4.2 G/DL (ref 3.5–5.2)
ALP SERPL-CCNC: 112 U/L (ref 40–129)
ALT SERPL W P-5'-P-CCNC: 24 U/L (ref 10–50)
ANION GAP SERPL CALCULATED.3IONS-SCNC: 11 MMOL/L (ref 7–15)
AST SERPL W P-5'-P-CCNC: 26 U/L (ref 10–50)
BILIRUB SERPL-MCNC: 0.4 MG/DL
BUN SERPL-MCNC: 7.4 MG/DL (ref 6–20)
CALCIUM SERPL-MCNC: 8.8 MG/DL (ref 8.6–10)
CHLORIDE SERPL-SCNC: 100 MMOL/L (ref 98–107)
CHOLEST SERPL-MCNC: 196 MG/DL
CREAT SERPL-MCNC: 0.85 MG/DL (ref 0.67–1.17)
CREAT UR-MCNC: 36 MG/DL
DEPRECATED HCO3 PLAS-SCNC: 28 MMOL/L (ref 22–29)
GFR SERPL CREATININE-BSD FRML MDRD: >90 ML/MIN/1.73M2
GLUCOSE SERPL-MCNC: 115 MG/DL (ref 70–99)
HBA1C MFR BLD: 4.8 % (ref 0–5.6)
HDLC SERPL-MCNC: 99 MG/DL
LDLC SERPL CALC-MCNC: 88 MG/DL
MICROALBUMIN UR-MCNC: <12 MG/L
MICROALBUMIN/CREAT UR: NORMAL MG/G{CREAT}
NONHDLC SERPL-MCNC: 97 MG/DL
POTASSIUM SERPL-SCNC: 3.8 MMOL/L (ref 3.4–5.3)
PROT SERPL-MCNC: 6.8 G/DL (ref 6.4–8.3)
SODIUM SERPL-SCNC: 139 MMOL/L (ref 136–145)
TRIGL SERPL-MCNC: 43 MG/DL
TSH SERPL DL<=0.005 MIU/L-ACNC: 1.15 UIU/ML (ref 0.3–4.2)

## 2022-09-09 PROCEDURE — 84443 ASSAY THYROID STIM HORMONE: CPT

## 2022-09-09 PROCEDURE — 80061 LIPID PANEL: CPT

## 2022-09-09 PROCEDURE — 80053 COMPREHEN METABOLIC PANEL: CPT

## 2022-09-09 PROCEDURE — 82043 UR ALBUMIN QUANTITATIVE: CPT

## 2022-09-09 PROCEDURE — 83036 HEMOGLOBIN GLYCOSYLATED A1C: CPT

## 2022-09-09 PROCEDURE — 36415 COLL VENOUS BLD VENIPUNCTURE: CPT

## 2022-10-04 ENCOUNTER — OFFICE VISIT (OUTPATIENT)
Dept: FAMILY MEDICINE | Facility: CLINIC | Age: 47
End: 2022-10-04
Payer: COMMERCIAL

## 2022-10-04 VITALS
WEIGHT: 218 LBS | OXYGEN SATURATION: 97 % | BODY MASS INDEX: 33.04 KG/M2 | SYSTOLIC BLOOD PRESSURE: 138 MMHG | TEMPERATURE: 98.5 F | HEIGHT: 68 IN | HEART RATE: 95 BPM | DIASTOLIC BLOOD PRESSURE: 82 MMHG

## 2022-10-04 DIAGNOSIS — Z11.59 NEED FOR HEPATITIS C SCREENING TEST: ICD-10-CM

## 2022-10-04 DIAGNOSIS — E78.5 HYPERLIPIDEMIA LDL GOAL <100: ICD-10-CM

## 2022-10-04 DIAGNOSIS — E10.9 TYPE 1 DIABETES MELLITUS WITHOUT COMPLICATION (H): ICD-10-CM

## 2022-10-04 DIAGNOSIS — Z12.11 SCREEN FOR COLON CANCER: ICD-10-CM

## 2022-10-04 DIAGNOSIS — Z00.00 ENCOUNTER FOR PREVENTATIVE ADULT HEALTH CARE EXAMINATION: Primary | ICD-10-CM

## 2022-10-04 PROCEDURE — 99214 OFFICE O/P EST MOD 30 MIN: CPT | Mod: 25 | Performed by: FAMILY MEDICINE

## 2022-10-04 PROCEDURE — 99396 PREV VISIT EST AGE 40-64: CPT | Performed by: FAMILY MEDICINE

## 2022-10-04 RX ORDER — INSULIN PMP CART,AUT,G6/7,CNTR
1 EACH SUBCUTANEOUS DAILY
Qty: 1 KIT | Refills: 0 | Status: SHIPPED | OUTPATIENT
Start: 2022-10-04 | End: 2022-12-09

## 2022-10-04 RX ORDER — PROCHLORPERAZINE 25 MG/1
1 SUPPOSITORY RECTAL
Qty: 1 EACH | Refills: 4 | Status: SHIPPED | OUTPATIENT
Start: 2022-10-04 | End: 2023-11-30

## 2022-10-04 RX ORDER — ATORVASTATIN CALCIUM 10 MG/1
10 TABLET, FILM COATED ORAL DAILY
Qty: 90 TABLET | Refills: 3 | Status: SHIPPED | OUTPATIENT
Start: 2022-10-04 | End: 2023-11-30

## 2022-10-04 RX ORDER — PROCHLORPERAZINE 25 MG/1
1 SUPPOSITORY RECTAL
Qty: 9 EACH | Refills: 4 | Status: SHIPPED | OUTPATIENT
Start: 2022-10-04 | End: 2023-11-30

## 2022-10-04 ASSESSMENT — ENCOUNTER SYMPTOMS
FEVER: 0
MYALGIAS: 0
HEARTBURN: 0
SHORTNESS OF BREATH: 0
DIARRHEA: 0
DIZZINESS: 0
CHILLS: 0
PALPITATIONS: 0
HEADACHES: 0
HEMATURIA: 0
ABDOMINAL PAIN: 0
ARTHRALGIAS: 0
EYE PAIN: 0
SORE THROAT: 0
PARESTHESIAS: 0
COUGH: 0
FREQUENCY: 0
NERVOUS/ANXIOUS: 0
NAUSEA: 0
JOINT SWELLING: 0
WEAKNESS: 0
DYSURIA: 0
HEMATOCHEZIA: 0
CONSTIPATION: 0

## 2022-10-04 NOTE — PROGRESS NOTES
SUBJECTIVE:   CC: Jon is an 47 year old who presents for preventative health visit.     Patient has been advised of split billing requirements and indicates understanding: Yes  Healthy Habits:     Getting at least 3 servings of Calcium per day:  Yes    Bi-annual eye exam:  NO    Dental care twice a year:  NO    Sleep apnea or symptoms of sleep apnea:  Excessive snoring    Diet:  Diabetic    Frequency of exercise:  None    Taking medications regularly:  Yes    Medication side effects:  None    PHQ-2 Total Score: 0    Additional concerns today:  No      Diabetes Follow-up    How often are you checking your blood sugar? Not at all    What concerns do you have today about your diabetes? None     Do you have any of these symptoms? (Select all that apply)  No numbness or tingling in feet.  No redness, sores or blisters on feet.  No complaints of excessive thirst.  No reports of blurry vision.  No significant changes to weight.    Have you had a diabetic eye exam in the last 12 months? No    BP Readings from Last 2 Encounters:   10/04/22 138/82   10/20/21 126/88     Hemoglobin A1C (%)   Date Value   09/09/2022 4.8   10/01/2021 5.1   05/07/2020 5.5   03/29/2019 5.5     LDL Cholesterol Calculated (mg/dL)   Date Value   09/09/2022 88   10/01/2021 88   05/07/2020 94   03/29/2019 86           Today's PHQ-2 Score:   PHQ-2 ( 1999 [employer]) 10/4/2022   Q1: Little interest or pleasure in doing things 0   Q2: Feeling down, depressed or hopeless 0   PHQ-2 Score 0   PHQ-2 Total Score (12-17 Years)- Positive if 3 or more points; Administer PHQ-A if positive -   Q1: Little interest or pleasure in doing things Not at all   Q2: Feeling down, depressed or hopeless Not at all   PHQ-2 Score 0       Abuse: Current or Past(Physical, Sexual or Emotional)- No  Do you feel safe in your environment? Yes    Have you ever done Advance Care Planning? (For example, a Health Directive, POLST, or a discussion with a medical provider or your loved ones  about your wishes): No, advance care planning information given to patient to review.  Patient declined advance care planning discussion at this time.    Social History     Tobacco Use     Smoking status: Never Smoker     Smokeless tobacco: Never Used   Substance Use Topics     Alcohol use: Yes     Comment: Rarely     If you drink alcohol do you typically have >3 drinks per day or >7 drinks per week? No    Alcohol Use 10/4/2022   Prescreen: >3 drinks/day or >7 drinks/week? No   Prescreen: >3 drinks/day or >7 drinks/week? -   No flowsheet data found.    Last PSA: No results found for: PSA    Reviewed orders with patient. Reviewed health maintenance and updated orders accordingly - Yes  Labs reviewed in EPIC    Reviewed and updated as needed this visit by clinical staff    Allergies  Meds                Reviewed and updated as needed this visit by Provider                   Past Medical History:   Diagnosis Date     Contact dermatitis and other eczema, due to unspecified cause      Other acne      Type I (juvenile type) diabetes mellitus without mention of complication, not stated as uncontrolled     On insulin pump        Review of Systems   Constitutional: Negative for chills and fever.   HENT: Negative for congestion, ear pain, hearing loss and sore throat.    Eyes: Negative for pain and visual disturbance.   Respiratory: Negative for cough and shortness of breath.    Cardiovascular: Negative for chest pain, palpitations and peripheral edema.   Gastrointestinal: Negative for abdominal pain, constipation, diarrhea, heartburn, hematochezia and nausea.   Genitourinary: Negative for dysuria, frequency, genital sores, hematuria and urgency.   Musculoskeletal: Negative for arthralgias, joint swelling and myalgias.   Skin: Negative for rash.   Neurological: Negative for dizziness, weakness, headaches and paresthesias.   Psychiatric/Behavioral: Negative for mood changes. The patient is not nervous/anxious.   "        OBJECTIVE:   /82   Pulse 95   Temp 98.5  F (36.9  C) (Tympanic)   Ht 1.721 m (5' 7.75\")   Wt 98.9 kg (218 lb)   SpO2 97%   BMI 33.39 kg/m      Physical Exam  GENERAL: healthy, alert and no distress  EYES: Eyes grossly normal to inspection, PERRL and conjunctivae and sclerae normal  HENT: ear canals and TM's normal, nose and mouth without ulcers or lesions  NECK: no adenopathy, no asymmetry, masses, or scars and thyroid normal to palpation  RESP: lungs clear to auscultation - no rales, rhonchi or wheezes  CV: regular rate and rhythm, normal S1 S2, no S3 or S4, no murmur, click or rub, no peripheral edema and peripheral pulses strong  ABDOMEN: soft, nontender, no hepatosplenomegaly, no masses and bowel sounds normal  MS: no gross musculoskeletal defects noted, no edema  SKIN: no suspicious lesions or rashes  NEURO: Normal strength and tone, mentation intact and speech normal  PSYCH: mentation appears normal, affect normal/bright    Diagnostic Test Results:  Labs reviewed in Epic  No results found for any visits on 10/04/22.      ASSESSMENT/PLAN:   (Z12.11) Screen for colon cancer  Comment:   Plan: Colonoscopy Screening  Referral            (Z11.59) Need for hepatitis C screening test  Comment:   Plan: Hepatitis C Screen Reflex to HCV RNA Quant and         Genotype            (E10.9) Type 1 diabetes mellitus without complication (H)  Comment: He is going to be getting a new pump also ordered Dexcom refills for him  Plan: Adult Eye  Referral, insulin infusion         pump ANNY, insulin lispro (HUMALOG VIAL) 100         UNIT/ML vial, insulin lispro (HUMALOG) 100         UNIT/ML vial, Continuous Blood Gluc Sensor         (DEXCOM G6 SENSOR) MISC, Continuous Blood Gluc         Transmit (DEXCOM G6 TRANSMITTER) MISC, Insulin         Disposable Pump (OMNIPOD 5 G6 INTRO, GEN 5,)         KIT            (E78.5) Hyperlipidemia LDL goal <100  Comment:   Plan: atorvastatin (LIPITOR) 10 MG " "tablet        Well-controlled    1. Encounter for preventative adult health care examination  Health maintenance ordered          COUNSELING:   Reviewed preventive health counseling, as reflected in patient instructions    Estimated body mass index is 33.39 kg/m  as calculated from the following:    Height as of this encounter: 1.721 m (5' 7.75\").    Weight as of this encounter: 98.9 kg (218 lb).     Weight management plan: Discussed healthy diet and exercise guidelines    He reports that he has never smoked. He has never used smokeless tobacco.      Counseling Resources:  ATP IV Guidelines  Pooled Cohorts Equation Calculator  FRAX Risk Assessment  ICSI Preventive Guidelines  Dietary Guidelines for Americans, 2010  USDA's MyPlate  ASA Prophylaxis  Lung CA Screening    Marysol Marc MD  [facility]  "

## 2022-11-19 ENCOUNTER — HEALTH MAINTENANCE LETTER (OUTPATIENT)
Age: 47
End: 2022-11-19

## 2022-12-21 ENCOUNTER — MYC MEDICAL ADVICE (OUTPATIENT)
Dept: CALL CENTER | Age: 47
End: 2022-12-21

## 2023-04-09 ENCOUNTER — HEALTH MAINTENANCE LETTER (OUTPATIENT)
Age: 48
End: 2023-04-09

## 2023-04-22 DIAGNOSIS — E10.9 TYPE 1 DIABETES MELLITUS WITHOUT COMPLICATION (H): ICD-10-CM

## 2023-04-24 RX ORDER — INSULIN PMP CART,AUT,G6/7,CNTR
EACH SUBCUTANEOUS
Qty: 15 EACH | Refills: 11 | Status: SHIPPED | OUTPATIENT
Start: 2023-04-24 | End: 2024-04-03

## 2023-07-06 ENCOUNTER — LAB (OUTPATIENT)
Dept: LAB | Facility: CLINIC | Age: 48
End: 2023-07-06
Payer: COMMERCIAL

## 2023-07-06 DIAGNOSIS — Z11.59 NEED FOR HEPATITIS C SCREENING TEST: ICD-10-CM

## 2023-07-06 DIAGNOSIS — E10.9 TYPE 1 DIABETES MELLITUS WITHOUT COMPLICATION (H): Primary | ICD-10-CM

## 2023-07-06 LAB — HBA1C MFR BLD: 5.3 % (ref 0–5.6)

## 2023-07-06 PROCEDURE — 86803 HEPATITIS C AB TEST: CPT

## 2023-07-06 PROCEDURE — 36415 COLL VENOUS BLD VENIPUNCTURE: CPT

## 2023-07-06 PROCEDURE — 83036 HEMOGLOBIN GLYCOSYLATED A1C: CPT

## 2023-07-07 LAB — HCV AB SERPL QL IA: NONREACTIVE

## 2023-07-12 NOTE — RESULT ENCOUNTER NOTE
Zak,  Your lab results were normal/stable. Please feel free to my chart or call the office with questions. Marysol Marc M.D.

## 2023-09-05 ENCOUNTER — PATIENT OUTREACH (OUTPATIENT)
Dept: CARE COORDINATION | Facility: CLINIC | Age: 48
End: 2023-09-05
Payer: COMMERCIAL

## 2023-09-19 ENCOUNTER — PATIENT OUTREACH (OUTPATIENT)
Dept: CARE COORDINATION | Facility: CLINIC | Age: 48
End: 2023-09-19
Payer: COMMERCIAL

## 2023-10-25 ENCOUNTER — TELEPHONE (OUTPATIENT)
Dept: FAMILY MEDICINE | Facility: CLINIC | Age: 48
End: 2023-10-25

## 2023-10-25 DIAGNOSIS — E78.5 HYPERLIPIDEMIA LDL GOAL <100: ICD-10-CM

## 2023-10-25 DIAGNOSIS — E10.9 TYPE 1 DIABETES MELLITUS WITHOUT COMPLICATION (H): Primary | ICD-10-CM

## 2023-10-25 NOTE — TELEPHONE ENCOUNTER
Katie Hogan is a 54year old male. HPI:   Patient presents with:  Test Results  Cough  Patient presents for follow up on sleep study results.     Patient did have corrective surgery for his KARLOS, has only been intermittently been using CPAP since th Patient has 11/30 visit with Tari. Requesting humalog to [facility] and orders for annual labs including A1C that he can get done prior to visit please. For some reason it wouldn't allow me to pend the med.    Thank you,    Evelia Thakur, ELSIE Marquis      Encounters:  09/22/17 : 284 lb  07/26/17 : 279 lb  05/31/17 : 282 lb  04/25/17 : 275 lb  10/06/15 : 260 lb    EXAM:   /84 (BP Location: Left arm, Patient Position: Sitting, Cuff Size: adult)   Pulse 86   Temp 98.5 °F (36.9 °C) (Oral)   Resp 12   Ht 7 on chronic issues, or earlier if acute issues arise.     Hamlet Kaminski MD

## 2023-11-19 ENCOUNTER — HEALTH MAINTENANCE LETTER (OUTPATIENT)
Age: 48
End: 2023-11-19

## 2023-11-30 ENCOUNTER — OFFICE VISIT (OUTPATIENT)
Dept: FAMILY MEDICINE | Facility: CLINIC | Age: 48
End: 2023-11-30
Payer: COMMERCIAL

## 2023-11-30 VITALS
SYSTOLIC BLOOD PRESSURE: 126 MMHG | DIASTOLIC BLOOD PRESSURE: 76 MMHG | HEART RATE: 94 BPM | TEMPERATURE: 97.8 F | RESPIRATION RATE: 16 BRPM | HEIGHT: 68 IN | OXYGEN SATURATION: 96 % | BODY MASS INDEX: 35.28 KG/M2 | WEIGHT: 232.8 LBS

## 2023-11-30 DIAGNOSIS — E78.5 HYPERLIPIDEMIA LDL GOAL <100: ICD-10-CM

## 2023-11-30 DIAGNOSIS — L71.9 ROSACEA: ICD-10-CM

## 2023-11-30 DIAGNOSIS — E10.9 TYPE 1 DIABETES MELLITUS WITHOUT COMPLICATION (H): Primary | ICD-10-CM

## 2023-11-30 DIAGNOSIS — Z12.11 SCREEN FOR COLON CANCER: ICD-10-CM

## 2023-11-30 DIAGNOSIS — Z97.8 USES SELF-APPLIED CONTINUOUS GLUCOSE MONITORING DEVICE: ICD-10-CM

## 2023-11-30 DIAGNOSIS — Z00.00 ROUTINE GENERAL MEDICAL EXAMINATION AT A HEALTH CARE FACILITY: ICD-10-CM

## 2023-11-30 DIAGNOSIS — E66.01 CLASS 2 SEVERE OBESITY DUE TO EXCESS CALORIES WITH SERIOUS COMORBIDITY AND BODY MASS INDEX (BMI) OF 35.0 TO 35.9 IN ADULT (H): ICD-10-CM

## 2023-11-30 DIAGNOSIS — E66.812 CLASS 2 SEVERE OBESITY DUE TO EXCESS CALORIES WITH SERIOUS COMORBIDITY AND BODY MASS INDEX (BMI) OF 35.0 TO 35.9 IN ADULT (H): ICD-10-CM

## 2023-11-30 DIAGNOSIS — Z96.41 INSULIN PUMP IN PLACE: ICD-10-CM

## 2023-11-30 LAB
ALBUMIN SERPL BCG-MCNC: 4.4 G/DL (ref 3.5–5.2)
ALP SERPL-CCNC: 111 U/L (ref 40–150)
ALT SERPL W P-5'-P-CCNC: 55 U/L (ref 0–70)
ANION GAP SERPL CALCULATED.3IONS-SCNC: 11 MMOL/L (ref 7–15)
AST SERPL W P-5'-P-CCNC: 33 U/L (ref 0–45)
BILIRUB SERPL-MCNC: 0.6 MG/DL
BUN SERPL-MCNC: 8 MG/DL (ref 6–20)
CALCIUM SERPL-MCNC: 9.2 MG/DL (ref 8.6–10)
CHLORIDE SERPL-SCNC: 102 MMOL/L (ref 98–107)
CHOLEST SERPL-MCNC: 173 MG/DL
CREAT SERPL-MCNC: 0.79 MG/DL (ref 0.67–1.17)
CREAT UR-MCNC: 26.1 MG/DL
DEPRECATED HCO3 PLAS-SCNC: 25 MMOL/L (ref 22–29)
EGFRCR SERPLBLD CKD-EPI 2021: >90 ML/MIN/1.73M2
GLUCOSE SERPL-MCNC: 88 MG/DL (ref 70–99)
HBA1C MFR BLD: 5 % (ref 0–5.6)
HDLC SERPL-MCNC: 86 MG/DL
LDLC SERPL CALC-MCNC: 78 MG/DL
MICROALBUMIN UR-MCNC: <12 MG/L
MICROALBUMIN/CREAT UR: NORMAL MG/G{CREAT}
NONHDLC SERPL-MCNC: 87 MG/DL
POTASSIUM SERPL-SCNC: 3.8 MMOL/L (ref 3.4–5.3)
PROT SERPL-MCNC: 7.4 G/DL (ref 6.4–8.3)
SODIUM SERPL-SCNC: 138 MMOL/L (ref 135–145)
TRIGL SERPL-MCNC: 43 MG/DL

## 2023-11-30 PROCEDURE — 80061 LIPID PANEL: CPT | Performed by: FAMILY MEDICINE

## 2023-11-30 PROCEDURE — 83036 HEMOGLOBIN GLYCOSYLATED A1C: CPT | Performed by: FAMILY MEDICINE

## 2023-11-30 PROCEDURE — 82570 ASSAY OF URINE CREATININE: CPT | Performed by: FAMILY MEDICINE

## 2023-11-30 PROCEDURE — 99396 PREV VISIT EST AGE 40-64: CPT | Performed by: FAMILY MEDICINE

## 2023-11-30 PROCEDURE — 80053 COMPREHEN METABOLIC PANEL: CPT | Performed by: FAMILY MEDICINE

## 2023-11-30 PROCEDURE — 99214 OFFICE O/P EST MOD 30 MIN: CPT | Mod: 25 | Performed by: FAMILY MEDICINE

## 2023-11-30 PROCEDURE — 82043 UR ALBUMIN QUANTITATIVE: CPT | Performed by: FAMILY MEDICINE

## 2023-11-30 PROCEDURE — 36415 COLL VENOUS BLD VENIPUNCTURE: CPT | Performed by: FAMILY MEDICINE

## 2023-11-30 RX ORDER — PROCHLORPERAZINE 25 MG/1
1 SUPPOSITORY RECTAL
Qty: 1 EACH | Refills: 4 | Status: SHIPPED | OUTPATIENT
Start: 2023-11-30 | End: 2024-07-23

## 2023-11-30 RX ORDER — PROCHLORPERAZINE 25 MG/1
1 SUPPOSITORY RECTAL
Qty: 9 EACH | Refills: 4 | Status: SHIPPED | OUTPATIENT
Start: 2023-11-30 | End: 2024-07-23

## 2023-11-30 RX ORDER — ATORVASTATIN CALCIUM 10 MG/1
10 TABLET, FILM COATED ORAL DAILY
Qty: 90 TABLET | Refills: 3 | Status: SHIPPED | OUTPATIENT
Start: 2023-11-30

## 2023-11-30 RX ORDER — CLINDAMYCIN PHOSPHATE 10 MG/G
GEL TOPICAL
Qty: 60 G | Refills: 11 | Status: SHIPPED | OUTPATIENT
Start: 2023-11-30

## 2023-11-30 ASSESSMENT — ENCOUNTER SYMPTOMS
DIARRHEA: 0
FREQUENCY: 0
SHORTNESS OF BREATH: 0
HEADACHES: 0
CONSTIPATION: 0
DYSURIA: 0
MYALGIAS: 0
EYE PAIN: 0
DIZZINESS: 0
CHILLS: 0
FEVER: 0
JOINT SWELLING: 0
ARTHRALGIAS: 0
PARESTHESIAS: 0
NERVOUS/ANXIOUS: 0
HEMATURIA: 0
PALPITATIONS: 0
COUGH: 0
HEARTBURN: 0
HEMATOCHEZIA: 0
NAUSEA: 0
ABDOMINAL PAIN: 0
WEAKNESS: 0
SORE THROAT: 0

## 2023-11-30 ASSESSMENT — PAIN SCALES - GENERAL: PAINLEVEL: NO PAIN (0)

## 2023-11-30 NOTE — PROGRESS NOTES
SUBJECTIVE:   Jon is a 48 year old, presenting for the following:  Physical        11/30/2023     4:43 PM   Additional Questions   Roomed by Natalia PADGETT MA   Accompanied by Self       Healthy Habits:     Getting at least 3 servings of Calcium per day:  NO    Bi-annual eye exam:  NO    Dental care twice a year:  Yes    Sleep apnea or symptoms of sleep apnea:  Excessive snoring    Diet:  Diabetic    Frequency of exercise:  None    Taking medications regularly:  Yes    Medication side effects:  Not applicable    Additional concerns today:  No  No worries     Today's PHQ-2 Score:       11/30/2023     4:42 PM   PHQ-2 ( 1999 Pfizer)   Q1: Little interest or pleasure in doing things 0   Q2: Feeling down, depressed or hopeless 0   PHQ-2 Score 0   Q1: Little interest or pleasure in doing things Not at all   Q2: Feeling down, depressed or hopeless Not at all   PHQ-2 Score 0           Diabetes Follow-up    How often are you checking your blood sugar? Continuous glucose monitor  What time of day are you checking your blood sugars (select all that apply)?  Not applicable  Have you had any blood sugars above 200?  Yes-not often though  Have you had any blood sugars below 70?  Yes not often though  What symptoms do you notice when your blood sugar is low?  Shaky  What concerns do you have today about your diabetes? None   Do you have any of these symptoms? (Select all that apply)  No numbness or tingling in feet.  No redness, sores or blisters on feet.  No complaints of excessive thirst.  No reports of blurry vision.  No significant changes to weight.  Have you had a diabetic eye exam in the last 12 months? No        BP Readings from Last 2 Encounters:   11/30/23 126/76   10/04/22 138/82     Hemoglobin A1C (%)   Date Value   07/06/2023 5.3   09/09/2022 4.8   05/07/2020 5.5   03/29/2019 5.5     LDL Cholesterol Calculated (mg/dL)   Date Value   09/09/2022 88   10/01/2021 88   05/07/2020 94   03/29/2019 86             Hyperlipidemia  "Follow-Up    Are you regularly taking any medication or supplement to lower your cholesterol?   Yes- atorvastatin  Are you having muscle aches or other side effects that you think could be caused by your cholesterol lowering medication?  No      Social History     Tobacco Use     Smoking status: Never     Passive exposure: Never     Smokeless tobacco: Never   Substance Use Topics     Alcohol use: Yes     Comment: Rarely             11/30/2023     4:42 PM   Alcohol Use   Prescreen: >3 drinks/day or >7 drinks/week? No       Last PSA: No results found for: \"PSA\"    Reviewed orders with patient. Reviewed health maintenance and updated orders accordingly - Yes  Lab work is in process    Reviewed and updated as needed this visit by clinical staff   Tobacco  Allergies  Meds              Reviewed and updated as needed this visit by Provider                     Review of Systems   Constitutional:  Negative for chills and fever.   HENT:  Negative for congestion, ear pain, hearing loss and sore throat.    Eyes:  Negative for pain and visual disturbance.   Respiratory:  Negative for cough and shortness of breath.    Cardiovascular:  Negative for chest pain, palpitations and peripheral edema.   Gastrointestinal:  Negative for abdominal pain, constipation, diarrhea, heartburn, hematochezia and nausea.   Genitourinary:  Negative for dysuria, frequency, genital sores, hematuria and urgency.   Musculoskeletal:  Negative for arthralgias, joint swelling and myalgias.   Skin:  Negative for rash.   Neurological:  Negative for dizziness, weakness, headaches and paresthesias.   Psychiatric/Behavioral:  Negative for mood changes. The patient is not nervous/anxious.          OBJECTIVE:   /76 (BP Location: Right arm, Patient Position: Chair, Cuff Size: Adult Large)   Pulse 94   Temp 97.8  F (36.6  C) (Tympanic)   Resp 16   Ht 1.721 m (5' 7.75\")   Wt 105.6 kg (232 lb 12.8 oz)   SpO2 96%   BMI 35.66 kg/m      Physical " Exam  GENERAL: healthy, alert and no distress  EYES: Eyes grossly normal to inspection, PERRL and conjunctivae and sclerae normal  HENT: ear canals and TM's normal, nose and mouth without ulcers or lesions  NECK: no adenopathy, no asymmetry, masses, or scars and thyroid normal to palpation  RESP: lungs clear to auscultation - no rales, rhonchi or wheezes  CV: regular rate and rhythm, normal S1 S2, no S3 or S4, no murmur, click or rub, no peripheral edema and peripheral pulses strong  MS: no gross musculoskeletal defects noted, no edema  SKIN: no suspicious lesions or rashes  NEURO: Normal strength and tone, mentation intact and speech normal  PSYCH: mentation appears normal, affect normal/bright    Diagnostic Test Results:  Labs reviewed in Epic  Results for orders placed or performed in visit on 11/30/23   Albumin Random Urine Quantitative with Creat Ratio     Status: None   Result Value Ref Range    Creatinine Urine mg/dL 26.1 mg/dL    Albumin Urine mg/L <12.0 mg/L    Albumin Urine mg/g Cr     Lipid panel reflex to direct LDL Fasting     Status: Normal   Result Value Ref Range    Cholesterol 173 <200 mg/dL    Triglycerides 43 <150 mg/dL    Direct Measure HDL 86 >=40 mg/dL    LDL Cholesterol Calculated 78 <=100 mg/dL    Non HDL Cholesterol 87 <130 mg/dL    Narrative    Cholesterol  Desirable:  <200 mg/dL    Triglycerides  Normal:  Less than 150 mg/dL  Borderline High:  150-199 mg/dL  High:  200-499 mg/dL  Very High:  Greater than or equal to 500 mg/dL    Direct Measure HDL  Female:  Greater than or equal to 50 mg/dL   Male:  Greater than or equal to 40 mg/dL    LDL Cholesterol  Desirable:  <100mg/dL  Above Desirable:  100-129 mg/dL   Borderline High:  130-159 mg/dL   High:  160-189 mg/dL   Very High:  >= 190 mg/dL    Non HDL Cholesterol  Desirable:  130 mg/dL  Above Desirable:  130-159 mg/dL  Borderline High:  160-189 mg/dL  High:  190-219 mg/dL  Very High:  Greater than or equal to 220 mg/dL   Comprehensive  metabolic panel (BMP + Alb, Alk Phos, ALT, AST, Total. Bili, TP)     Status: Normal   Result Value Ref Range    Sodium 138 135 - 145 mmol/L    Potassium 3.8 3.4 - 5.3 mmol/L    Carbon Dioxide (CO2) 25 22 - 29 mmol/L    Anion Gap 11 7 - 15 mmol/L    Urea Nitrogen 8.0 6.0 - 20.0 mg/dL    Creatinine 0.79 0.67 - 1.17 mg/dL    GFR Estimate >90 >60 mL/min/1.73m2    Calcium 9.2 8.6 - 10.0 mg/dL    Chloride 102 98 - 107 mmol/L    Glucose 88 70 - 99 mg/dL    Alkaline Phosphatase 111 40 - 150 U/L    AST 33 0 - 45 U/L    ALT 55 0 - 70 U/L    Protein Total 7.4 6.4 - 8.3 g/dL    Albumin 4.4 3.5 - 5.2 g/dL    Bilirubin Total 0.6 <=1.2 mg/dL   Hemoglobin A1c     Status: Normal   Result Value Ref Range    Hemoglobin A1C 5.0 0.0 - 5.6 %       ASSESSMENT/PLAN:   (E10.9) Type 1 diabetes mellitus without complication (H)  (primary encounter diagnosis)  Comment:   Plan: Continuous Blood Gluc Sensor (DEXCOM G6 SENSOR)        MISC, Continuous Blood Gluc Transmit (DEXCOM G6        TRANSMITTER) MISC, FOOT EXAM        Very well controlled     (Z12.11) Screen for colon cancer  Comment:   Plan: Colonoscopy Screening  Referral        Due     (E78.5) Hyperlipidemia LDL goal <100  Comment:   Plan: atorvastatin (LIPITOR) 10 MG tablet        Excellent control     (L71.9) Rosacea  Comment:   Plan: clindamycin (CLINDAMAX) 1 % external gel        Uses as needed and is helpful        5. Routine general medical examination at a health care facility      6. Class 2 severe obesity due to excess calories with serious comorbidity and body mass index (BMI) of 35.0 to 35.9 in adult (H)  He cont to work on this         COUNSELING:   Reviewed preventive health counseling, as reflected in patient instructions        He reports that he has never smoked. He has never been exposed to tobacco smoke. He has never used smokeless tobacco.            Marysol Marc MD  [facility]

## 2023-12-10 PROBLEM — E66.01 CLASS 2 SEVERE OBESITY DUE TO EXCESS CALORIES WITH SERIOUS COMORBIDITY IN ADULT (H): Status: ACTIVE | Noted: 2023-12-10

## 2023-12-10 PROBLEM — E66.812 CLASS 2 SEVERE OBESITY DUE TO EXCESS CALORIES WITH SERIOUS COMORBIDITY IN ADULT (H): Status: ACTIVE | Noted: 2023-12-10

## 2023-12-10 PROBLEM — E78.5 HYPERLIPIDEMIA LDL GOAL <100: Status: ACTIVE | Noted: 2023-12-10

## 2023-12-10 PROBLEM — L71.9 ROSACEA: Status: ACTIVE | Noted: 2023-12-10

## 2024-01-24 PROBLEM — Z96.41 INSULIN PUMP IN PLACE: Status: ACTIVE | Noted: 2024-01-24

## 2024-01-24 PROBLEM — Z97.8 USES SELF-APPLIED CONTINUOUS GLUCOSE MONITORING DEVICE: Status: ACTIVE | Noted: 2024-01-24

## 2024-04-03 DIAGNOSIS — E10.9 TYPE 1 DIABETES MELLITUS WITHOUT COMPLICATION (H): ICD-10-CM

## 2024-04-03 RX ORDER — INSULIN PMP CART,AUT,G6/7,CNTR
EACH SUBCUTANEOUS
Qty: 15 EACH | Refills: 11 | Status: SHIPPED | OUTPATIENT
Start: 2024-04-03

## 2024-06-13 ENCOUNTER — TELEPHONE (OUTPATIENT)
Dept: FAMILY MEDICINE | Facility: CLINIC | Age: 49
End: 2024-06-13

## 2024-06-13 NOTE — TELEPHONE ENCOUNTER
Patient Quality Outreach    Patient is due for the following:   Colon Cancer Screening    Next Steps:   Patient was scheduled for      Type of outreach:    Sent [employer] message.      Questions for provider review:    None           Jolly Box, MARI  Chart routed to .

## 2024-06-16 ENCOUNTER — HEALTH MAINTENANCE LETTER (OUTPATIENT)
Age: 49
End: 2024-06-16

## 2024-07-18 ENCOUNTER — TELEPHONE (OUTPATIENT)
Dept: FAMILY MEDICINE | Facility: CLINIC | Age: 49
End: 2024-07-18
Payer: COMMERCIAL

## 2024-07-18 DIAGNOSIS — E10.9 TYPE 1 DIABETES MELLITUS WITHOUT COMPLICATION (H): Primary | ICD-10-CM

## 2024-07-18 NOTE — TELEPHONE ENCOUNTER
FYI - Status Update    Who is Calling: patient    Update: patient scheduled labs for meds, Dexcom requires he has diabetic panels for A1C and Hema globin and whichever tests the PCP suggests. Patient scheduled for 7/19/24 and he was hoping to have the Order put in ASAP    Does caller want a call/response back: Yes     Could we send this information to you in [employer] or would you prefer to receive a phone call?:   Patient would prefer a phone call   Okay to leave a detailed message?: Yes at Cell number on file:    Telephone Information:   Mobile [phone]

## 2024-07-19 ENCOUNTER — LAB (OUTPATIENT)
Dept: LAB | Facility: CLINIC | Age: 49
End: 2024-07-19
Payer: COMMERCIAL

## 2024-07-19 DIAGNOSIS — E10.9 TYPE 1 DIABETES MELLITUS WITHOUT COMPLICATION (H): ICD-10-CM

## 2024-07-19 LAB
ERYTHROCYTE [DISTWIDTH] IN BLOOD BY AUTOMATED COUNT: 13.3 % (ref 10–15)
HBA1C MFR BLD: 5.3 % (ref 0–5.6)
HCT VFR BLD AUTO: 44.6 % (ref 40–53)
HGB BLD-MCNC: 14.9 G/DL (ref 13.3–17.7)
MCH RBC QN AUTO: 29.4 PG (ref 26.5–33)
MCHC RBC AUTO-ENTMCNC: 33.4 G/DL (ref 31.5–36.5)
MCV RBC AUTO: 88 FL (ref 78–100)
PLATELET # BLD AUTO: 348 10E3/UL (ref 150–450)
RBC # BLD AUTO: 5.07 10E6/UL (ref 4.4–5.9)
WBC # BLD AUTO: 8.9 10E3/UL (ref 4–11)

## 2024-07-19 PROCEDURE — 36415 COLL VENOUS BLD VENIPUNCTURE: CPT

## 2024-07-19 PROCEDURE — 85027 COMPLETE CBC AUTOMATED: CPT

## 2024-07-19 PROCEDURE — 83036 HEMOGLOBIN GLYCOSYLATED A1C: CPT

## 2024-07-23 RX ORDER — PROCHLORPERAZINE 25 MG/1
1 SUPPOSITORY RECTAL
Qty: 1 EACH | Refills: 4 | Status: SHIPPED | OUTPATIENT
Start: 2024-07-23

## 2024-07-23 RX ORDER — PROCHLORPERAZINE 25 MG/1
1 SUPPOSITORY RECTAL
Qty: 9 EACH | Refills: 4 | Status: SHIPPED | OUTPATIENT
Start: 2024-07-23

## 2024-07-23 NOTE — TELEPHONE ENCOUNTER
Jon calls asking us to send in his Dexcom G6 Sensors and Transmitters to [facility] and to note his new A1C on the Rx as they called him looking for it. I did send them in and noted his A1C from 7/19/24.    Mila Shah RN;

## 2024-07-29 ENCOUNTER — TELEPHONE (OUTPATIENT)
Dept: FAMILY MEDICINE | Facility: CLINIC | Age: 49
End: 2024-07-29
Payer: COMMERCIAL

## 2024-07-29 NOTE — TELEPHONE ENCOUNTER
[facility] called and asked for office visit notes from the last 6months. Told them that patient has been asked to schedule and has not been seen since 11/23.    THey said they will contact the patient.

## 2024-10-14 ENCOUNTER — TELEPHONE (OUTPATIENT)
Dept: FAMILY MEDICINE | Facility: CLINIC | Age: 49
End: 2024-10-14
Payer: COMMERCIAL

## 2024-10-14 NOTE — TELEPHONE ENCOUNTER
Jon dropped off Loss of consciousness form for the [place] [employer] of Public safety to be filled out.and signed.     Pt wants the form emailed back to him.    Left on providers desk.

## 2024-10-17 ENCOUNTER — OFFICE VISIT (OUTPATIENT)
Dept: FAMILY MEDICINE | Facility: CLINIC | Age: 49
End: 2024-10-17
Payer: COMMERCIAL

## 2024-10-17 VITALS
BODY MASS INDEX: 36.53 KG/M2 | WEIGHT: 241 LBS | HEART RATE: 88 BPM | DIASTOLIC BLOOD PRESSURE: 104 MMHG | OXYGEN SATURATION: 98 % | HEIGHT: 68 IN | SYSTOLIC BLOOD PRESSURE: 154 MMHG | TEMPERATURE: 98.5 F

## 2024-10-17 DIAGNOSIS — L71.9 ROSACEA: ICD-10-CM

## 2024-10-17 DIAGNOSIS — Z00.00 ROUTINE GENERAL MEDICAL EXAMINATION AT A HEALTH CARE FACILITY: ICD-10-CM

## 2024-10-17 DIAGNOSIS — E78.5 HYPERLIPIDEMIA LDL GOAL <100: ICD-10-CM

## 2024-10-17 DIAGNOSIS — E10.9 TYPE 1 DIABETES MELLITUS WITHOUT COMPLICATION (H): Primary | ICD-10-CM

## 2024-10-17 PROCEDURE — 99213 OFFICE O/P EST LOW 20 MIN: CPT | Mod: 25 | Performed by: FAMILY MEDICINE

## 2024-10-17 PROCEDURE — 99396 PREV VISIT EST AGE 40-64: CPT | Performed by: FAMILY MEDICINE

## 2024-10-17 RX ORDER — INSULIN LISPRO 100 [IU]/ML
INJECTION, SOLUTION INTRAVENOUS; SUBCUTANEOUS
Qty: 90 ML | Refills: 4 | Status: SHIPPED | OUTPATIENT
Start: 2024-10-17

## 2024-10-17 RX ORDER — CLINDAMYCIN PHOSPHATE 10 MG/G
GEL TOPICAL
Qty: 60 G | Refills: 11 | Status: SHIPPED | OUTPATIENT
Start: 2024-10-17

## 2024-10-17 RX ORDER — INSULIN LISPRO 100 [IU]/ML
INJECTION, SOLUTION INTRAVENOUS; SUBCUTANEOUS
Status: CANCELLED | OUTPATIENT
Start: 2024-10-17

## 2024-10-17 RX ORDER — INSULIN LISPRO 100 [IU]/ML
INJECTION, SOLUTION INTRAVENOUS; SUBCUTANEOUS
Qty: 90 ML | Refills: 4 | Status: SHIPPED | OUTPATIENT
Start: 2024-10-17 | End: 2024-10-17

## 2024-10-17 RX ORDER — ATORVASTATIN CALCIUM 10 MG/1
10 TABLET, FILM COATED ORAL DAILY
Qty: 90 TABLET | Refills: 3 | Status: SHIPPED | OUTPATIENT
Start: 2024-10-17

## 2024-10-17 SDOH — HEALTH STABILITY: PHYSICAL HEALTH: ON AVERAGE, HOW MANY DAYS PER WEEK DO YOU ENGAGE IN MODERATE TO STRENUOUS EXERCISE (LIKE A BRISK WALK)?: 1 DAY

## 2024-10-17 SDOH — HEALTH STABILITY: PHYSICAL HEALTH: ON AVERAGE, HOW MANY MINUTES DO YOU ENGAGE IN EXERCISE AT THIS LEVEL?: 30 MIN

## 2024-10-17 ASSESSMENT — SOCIAL DETERMINANTS OF HEALTH (SDOH): HOW OFTEN DO YOU GET TOGETHER WITH FRIENDS OR RELATIVES?: ONCE A WEEK

## 2024-10-17 NOTE — PATIENT INSTRUCTIONS
Patient Education   Preventive Care Advice   This is general advice given by our system to help you stay healthy. However, your care team may have specific advice just for you. Please talk to your care team about your preventive care needs.  Nutrition  Eat 5 or more servings of fruits and vegetables each day.  Try wheat bread, brown rice and whole grain pasta (instead of white bread, rice, and pasta).  Get enough calcium and vitamin D. Check the label on foods and aim for 100% of the RDA (recommended daily allowance).  Lifestyle  Exercise at least 150 minutes each week  (30 minutes a day, 5 days a week).  Do muscle strengthening activities 2 days a week. These help control your weight and prevent disease.  No smoking.  Wear sunscreen to prevent skin cancer.  Have a dental exam and cleaning every 6 months.  Yearly exams  See your health care team every year to talk about:  Any changes in your health.  Any medicines your care team has prescribed.  Preventive care, family planning, and ways to prevent chronic diseases.  Shots (vaccines)   HPV shots (up to age 26), if you've never had them before.  Hepatitis B shots (up to age 59), if you've never had them before.  COVID-19 shot: Get this shot when it's due.  Flu shot: Get a flu shot every year.  Tetanus shot: Get a tetanus shot every 10 years.  Pneumococcal, hepatitis A, and RSV shots: Ask your care team if you need these based on your risk.  Shingles shot (for age 50 and up)  General health tests  Diabetes screening:  Starting at age 35, Get screened for diabetes at least every 3 years.  If you are younger than age 35, ask your care team if you should be screened for diabetes.  Cholesterol test: At age 39, start having a cholesterol test every 5 years, or more often if advised.  Bone density scan (DEXA): At age 50, ask your care team if you should have this scan for osteoporosis (brittle bones).  Hepatitis C: Get tested at least once in your life.  STIs (sexually  transmitted infections)  Before age 24: Ask your care team if you should be screened for STIs.  After age 24: Get screened for STIs if you're at risk. You are at risk for STIs (including HIV) if:  You are sexually active with more than one person.  You don't use condoms every time.  You or a partner was diagnosed with a sexually transmitted infection.  If you are at risk for HIV, ask about PrEP medicine to prevent HIV.  Get tested for HIV at least once in your life, whether you are at risk for HIV or not.  Cancer screening tests  Cervical cancer screening: If you have a cervix, begin getting regular cervical cancer screening tests starting at age 21.  Breast cancer scan (mammogram): If you've ever had breasts, begin having regular mammograms starting at age 40. This is a scan to check for breast cancer.  Colon cancer screening: It is important to start screening for colon cancer at age 45.  Have a colonoscopy test every 10 years (or more often if you're at risk) Or, ask your provider about stool tests like a FIT test every year or Cologuard test every 3 years.  To learn more about your testing options, visit:   .  For help making a decision, visit:   https://bit.ly/ou65911.  Prostate cancer screening test: If you have a prostate, ask your care team if a prostate cancer screening test (PSA) at age 55 is right for you.  Lung cancer screening: If you are a current or former smoker ages 50 to 80, ask your care team if ongoing lung cancer screenings are right for you.  For informational purposes only. Not to replace the advice of your health care provider. Copyright   2023 [employer]. All rights reserved. Clinically reviewed by the [facility] Transitions Program. [employer] 268344 - REV 01/24.

## 2024-10-17 NOTE — PROGRESS NOTES
"Preventive Care Visit  [facility] ISHA Marc MD, Family Medicine  Oct 17, 2024      Assessment & Plan     Type 1 diabetes mellitus without complication (H)  Good control  - OPTOMETRY REFERRAL; Future  - insulin lispro (HUMALOG VIAL) 100 UNIT/ML vial; Use up to 90 unit(s) per day  Patient uses CGM to help manage his diabetes. He has  been using this with good success as his A1c is 5.3  he should continue to use the cgm with his insulin pump to maintain good control.   Routine general medical examination at a health care facility      Hyperlipidemia LDL goal <100  Cont   - atorvastatin (LIPITOR) 10 MG tablet; Take 1 tablet (10 mg) by mouth daily.    Rosacea  Cont this as this is controlling this well   - clindamycin (CLEOCIN-T) 1 % external gel; APPLY TOPICALLY TWICE A DAY    Patient has been advised of split billing requirements and indicates understanding: Yes        BMI  Estimated body mass index is 36.91 kg/m  as calculated from the following:    Height as of this encounter: 1.721 m (5' 7.75\").    Weight as of this encounter: 109.3 kg (241 lb).   Weight management plan: Discussed healthy diet and exercise guidelines    Counseling  Appropriate preventive services were addressed with this patient via screening, questionnaire, or discussion as appropriate for fall prevention, nutrition, physical activity, Tobacco-use cessation, social engagement, weight loss and cognition.  Checklist reviewing preventive services available has been given to the patient.  Reviewed patient's diet, addressing concerns and/or questions.   He is at risk for lack of exercise and has been provided with information to increase physical activity for the benefit of his well-being.   He is at risk for psychosocial distress and has been provided with information to reduce risk.       FUTURE APPOINTMENTS:       - Follow-up visit in 6 months     Anson Webb is a 49 year old, presenting for the " following:  Physical        10/17/2024     4:13 PM   Additional Questions   Roomed by Jolly COELHO CMA        Health Care Directive  Patient does not have a Health Care Directive or Living Will: Discussed advance care planning with patient; however, patient declined at this time.    HPI                10/17/2024   General Health   How would you rate your overall physical health? (!) FAIR   Feel stress (tense, anxious, or unable to sleep) Only a little      (!) STRESS CONCERN      10/17/2024   Nutrition   Three or more servings of calcium each day? Yes   Diet: Diabetic    Carbohydrate counting   How many servings of fruit and vegetables per day? (!) 2-3   How many sweetened beverages each day? 0-1       Multiple values from one day are sorted in reverse-chronological order         10/17/2024   Exercise   Days per week of moderate/strenous exercise 1 day   Average minutes spent exercising at this level 30 min      (!) EXERCISE CONCERN      10/17/2024   Social Factors   Frequency of gathering with friends or relatives Once a week   Worry food won't last until get money to buy more No   Food not last or not have enough money for food? No   Do you have housing? (Housing is defined as stable permanent housing and does not include staying ouside in a car, in a tent, in an abandoned building, in an overnight shelter, or couch-surfing.) Yes   Are you worried about losing your housing? No   Lack of transportation? No   Unable to get utilities (heat,electricity)? No            10/17/2024   Dental   Dentist two times every year? Yes            10/17/2024   TB Screening   Were you born outside of the [place]? No            Today's PHQ-2 Score:       10/17/2024     4:16 PM   PHQ-2 ( 1999 [employer])   Q1: Little interest or pleasure in doing things 0   Q2: Feeling down, depressed or hopeless 0   PHQ-2 Score 0   Q1: Little interest or pleasure in doing things Not at all   Q2: Feeling down, depressed or hopeless Not at all   PHQ-2 Score 0  "          10/17/2024   Substance Use   Alcohol more than 3/day or more than 7/wk No   Do you use any other substances recreationally? No        Social History     Tobacco Use    Smoking status: Never     Passive exposure: Never    Smokeless tobacco: Never   Vaping Use    Vaping status: Never Used   Substance Use Topics    Alcohol use: Yes     Comment: Rarely    Drug use: No           10/17/2024   STI Screening   New sexual partner(s) since last STI/HIV test? No      ASCVD Risk   The 10-year ASCVD risk score (Keila GALLOWAY, et al., 2019) is: 2.8%    Values used to calculate the score:      Age: 49 years      Sex: Male      Is Non- : No      Diabetic: Yes      Tobacco smoker: No      Systolic Blood Pressure: 154 mmHg      Is BP treated: No      HDL Cholesterol: 86 mg/dL      Total Cholesterol: 173 mg/dL        10/17/2024   Contraception/Family Planning   Questions about contraception or family planning No           Reviewed and updated as needed this visit by Provider                    Past Medical History:   Diagnosis Date    Contact dermatitis and other eczema, due to unspecified cause     Other acne     Type I (juvenile type) diabetes mellitus without mention of complication, not stated as uncontrolled     On insulin pump     Good control of the blood sugar needs renewal for the dexcom. This has worked very well for him     Review of Systems  Constitutional, HEENT, cardiovascular, pulmonary, gi and gu systems are negative, except as otherwise noted.     Objective    Exam  BP (!) 154/104 (BP Location: Right arm, Patient Position: Chair, Cuff Size: Adult Large)   Pulse 88   Temp 98.5  F (36.9  C) (Tympanic)   Ht 1.721 m (5' 7.75\")   Wt 109.3 kg (241 lb)   SpO2 98%   BMI 36.91 kg/m     Estimated body mass index is 36.91 kg/m  as calculated from the following:    Height as of this encounter: 1.721 m (5' 7.75\").    Weight as of this encounter: 109.3 kg (241 lb).    Physical " Exam  GENERAL: alert and no distress  EYES: Eyes grossly normal to inspection, PERRL and conjunctivae and sclerae normal  HENT: ear canals and TM's normal, nose and mouth without ulcers or lesions  NECK: no adenopathy, no asymmetry, masses, or scars  RESP: lungs clear to auscultation - no rales, rhonchi or wheezes  CV: regular rate and rhythm, normal S1 S2, no S3 or S4, no murmur, click or rub, no peripheral edema   SKIN: no suspicious lesions or rashes minimal erythema on cheeks   PSYCH: mentation appears normal, affect normal/bright        Signed Electronically by: Marysol Marc MD

## 2024-11-07 ENCOUNTER — TELEPHONE (OUTPATIENT)
Dept: FAMILY MEDICINE | Facility: CLINIC | Age: 49
End: 2024-11-07
Payer: COMMERCIAL

## 2024-11-07 NOTE — TELEPHONE ENCOUNTER
Pt called and said Christina Macedo need pts lab results and appt notes from the past 6 months. Pt stated christina Macedo has been trying to get a hold of us ? Pt did not have fax number but # [phone]

## 2024-11-07 NOTE — TELEPHONE ENCOUNTER
Christina barnett calling back requesting notes and labs from the last 6 months.     Fax: [phone]    Notes faxed.     Duke Staton, [facility] Jossat

## 2024-11-13 NOTE — TELEPHONE ENCOUNTER
Dr Marc wanted to know if pt could send the CGM data ? Spoke to patient and he looked at his luly and does not see a way to send it.

## 2024-11-14 NOTE — TELEPHONE ENCOUNTER
Faxed addended notes from 10/17, Dr Marc,  to [facility] at [phone].      Ondina Chung on 11/14/2024 at 9:54 AM

## 2025-02-01 ENCOUNTER — HEALTH MAINTENANCE LETTER (OUTPATIENT)
Age: 50
End: 2025-02-01

## 2025-02-24 ENCOUNTER — DOCUMENTATION ONLY (OUTPATIENT)
Dept: FAMILY MEDICINE | Facility: CLINIC | Age: 50
End: 2025-02-24
Payer: COMMERCIAL

## 2025-02-24 DIAGNOSIS — E10.9 TYPE 1 DIABETES MELLITUS WITHOUT COMPLICATION (H): Primary | ICD-10-CM

## 2025-02-24 NOTE — PROGRESS NOTES
Zak Ortiz has an upcoming lab appointment:    Future Appointments   Date Time Provider Department Center   2/25/2025  4:30 PM [facility]     Patient is scheduled for the following lab(s):   Scheduling Notes: Lab: PCP (reason unknown)   Made On: 2/21/2025 3:57 PM By: JUANIS ABDUL       There is no order available. Please review and place either future orders or HMPO (Review of Health Maintenance Protocol Orders), as appropriate.    Health Maintenance Due   Topic    BMP     LIPID     MICROALBUMIN     ANNUAL REVIEW OF HM ORDERS     A1C      Nestor Avila

## 2025-02-25 ENCOUNTER — LAB (OUTPATIENT)
Dept: LAB | Facility: CLINIC | Age: 50
End: 2025-02-25
Payer: COMMERCIAL

## 2025-02-25 DIAGNOSIS — E10.9 TYPE 1 DIABETES MELLITUS WITHOUT COMPLICATION (H): ICD-10-CM

## 2025-02-25 LAB
EST. AVERAGE GLUCOSE BLD GHB EST-MCNC: 103 MG/DL
HBA1C MFR BLD: 5.2 % (ref 0–5.6)

## 2025-02-25 PROCEDURE — 80061 LIPID PANEL: CPT

## 2025-02-25 PROCEDURE — 83036 HEMOGLOBIN GLYCOSYLATED A1C: CPT

## 2025-02-25 PROCEDURE — 82043 UR ALBUMIN QUANTITATIVE: CPT

## 2025-02-25 PROCEDURE — 36415 COLL VENOUS BLD VENIPUNCTURE: CPT

## 2025-02-25 PROCEDURE — 82570 ASSAY OF URINE CREATININE: CPT

## 2025-02-25 PROCEDURE — 80048 BASIC METABOLIC PNL TOTAL CA: CPT

## 2025-02-26 LAB
ANION GAP SERPL CALCULATED.3IONS-SCNC: 11 MMOL/L (ref 7–15)
BUN SERPL-MCNC: 5.1 MG/DL (ref 6–20)
CALCIUM SERPL-MCNC: 8.9 MG/DL (ref 8.8–10.4)
CHLORIDE SERPL-SCNC: 100 MMOL/L (ref 98–107)
CHOLEST SERPL-MCNC: 176 MG/DL
CREAT SERPL-MCNC: 0.75 MG/DL (ref 0.67–1.17)
CREAT UR-MCNC: 34 MG/DL
EGFRCR SERPLBLD CKD-EPI 2021: >90 ML/MIN/1.73M2
FASTING STATUS PATIENT QL REPORTED: YES
FASTING STATUS PATIENT QL REPORTED: YES
GLUCOSE SERPL-MCNC: 139 MG/DL (ref 70–99)
HCO3 SERPL-SCNC: 26 MMOL/L (ref 22–29)
HDLC SERPL-MCNC: 69 MG/DL
LDLC SERPL CALC-MCNC: 96 MG/DL
MICROALBUMIN UR-MCNC: <12 MG/L
MICROALBUMIN/CREAT UR: NORMAL MG/G{CREAT}
NONHDLC SERPL-MCNC: 107 MG/DL
POTASSIUM SERPL-SCNC: 3.8 MMOL/L (ref 3.4–5.3)
SODIUM SERPL-SCNC: 137 MMOL/L (ref 135–145)
TRIGL SERPL-MCNC: 55 MG/DL

## 2025-03-28 ENCOUNTER — MYC MEDICAL ADVICE (OUTPATIENT)
Dept: FAMILY MEDICINE | Facility: CLINIC | Age: 50
End: 2025-03-28
Payer: COMMERCIAL

## 2025-03-28 DIAGNOSIS — E10.9 TYPE 1 DIABETES MELLITUS WITHOUT COMPLICATION (H): Primary | ICD-10-CM

## 2025-03-31 RX ORDER — PROCHLORPERAZINE 25 MG/1
1 SUPPOSITORY RECTAL
Qty: 1 EACH | Refills: 4 | Status: SHIPPED | OUTPATIENT
Start: 2025-03-31

## 2025-03-31 RX ORDER — PROCHLORPERAZINE 25 MG/1
1 SUPPOSITORY RECTAL
Qty: 9 EACH | Refills: 4 | Status: SHIPPED | OUTPATIENT
Start: 2025-03-31

## 2025-04-05 RX ORDER — INSULIN PMP CART,AUT,G6/7,CNTR
1 EACH SUBCUTANEOUS EVERY OTHER DAY
Qty: 15 EACH | Refills: 11 | Status: SHIPPED | OUTPATIENT
Start: 2025-04-05

## 2025-07-01 ENCOUNTER — TELEPHONE (OUTPATIENT)
Dept: FAMILY MEDICINE | Facility: CLINIC | Age: 50
End: 2025-07-01

## 2025-08-08 ENCOUNTER — HOSPITAL ENCOUNTER (EMERGENCY)
Facility: HOSPITAL | Age: 50
Discharge: HOME | End: 2025-08-08
Attending: EMERGENCY MEDICINE | Admitting: EMERGENCY MEDICINE
Payer: COMMERCIAL

## 2025-08-08 VITALS
TEMPERATURE: 98 F | HEART RATE: 97 BPM | BODY MASS INDEX: 32.58 KG/M2 | WEIGHT: 220 LBS | RESPIRATION RATE: 26 BRPM | DIASTOLIC BLOOD PRESSURE: 87 MMHG | OXYGEN SATURATION: 97 % | SYSTOLIC BLOOD PRESSURE: 160 MMHG | HEIGHT: 69 IN

## 2025-08-08 DIAGNOSIS — E16.2 HYPOGLYCEMIA: Primary | ICD-10-CM

## 2025-08-08 LAB
ALBUMIN SERPL BCG-MCNC: 4.1 G/DL (ref 3.5–5.2)
ALP SERPL-CCNC: 96 U/L (ref 40–150)
ALT SERPL W P-5'-P-CCNC: 22 U/L (ref 0–70)
ANION GAP SERPL CALCULATED.3IONS-SCNC: 12 MMOL/L (ref 7–15)
AST SERPL W P-5'-P-CCNC: 24 U/L (ref 0–45)
BASOPHILS # BLD AUTO: 0.1 10E3/UL (ref 0–0.2)
BASOPHILS NFR BLD AUTO: 1 %
BILIRUB SERPL-MCNC: 0.4 MG/DL
BUN SERPL-MCNC: 6.6 MG/DL (ref 6–20)
CALCIUM SERPL-MCNC: 8.9 MG/DL (ref 8.8–10.4)
CHLORIDE SERPL-SCNC: 103 MMOL/L (ref 98–107)
CREAT SERPL-MCNC: 0.81 MG/DL (ref 0.67–1.17)
EGFRCR SERPLBLD CKD-EPI 2021: >90 ML/MIN/1.73M2
EOSINOPHIL # BLD AUTO: 0.2 10E3/UL (ref 0–0.7)
EOSINOPHIL NFR BLD AUTO: 2 %
ERYTHROCYTE [DISTWIDTH] IN BLOOD BY AUTOMATED COUNT: 14.2 % (ref 10–15)
GLUCOSE BLDC GLUCOMTR-MCNC: 73 MG/DL (ref 70–99)
GLUCOSE SERPL-MCNC: 95 MG/DL (ref 70–99)
HCO3 SERPL-SCNC: 22 MMOL/L (ref 22–29)
HCT VFR BLD AUTO: 43.8 % (ref 40–53)
HGB BLD-MCNC: 14.6 G/DL (ref 13.3–17.7)
IMM GRANULOCYTES # BLD: 0.1 10E3/UL
IMM GRANULOCYTES NFR BLD: 1 %
LYMPHOCYTES # BLD AUTO: 1.5 10E3/UL (ref 0.8–5.3)
LYMPHOCYTES NFR BLD AUTO: 11 %
MCH RBC QN AUTO: 28.7 PG (ref 26.5–33)
MCHC RBC AUTO-ENTMCNC: 33.3 G/DL (ref 31.5–36.5)
MCV RBC AUTO: 86 FL (ref 78–100)
MONOCYTES # BLD AUTO: 1.2 10E3/UL (ref 0–1.3)
MONOCYTES NFR BLD AUTO: 9 %
NEUTROPHILS # BLD AUTO: 10.8 10E3/UL (ref 1.6–8.3)
NEUTROPHILS NFR BLD AUTO: 78 %
NRBC # BLD AUTO: 0 10E3/UL
NRBC BLD AUTO-RTO: 0 /100
PLATELET # BLD AUTO: 360 10E3/UL (ref 150–450)
POTASSIUM SERPL-SCNC: 3.8 MMOL/L (ref 3.4–5.3)
PROT SERPL-MCNC: 7.1 G/DL (ref 6.4–8.3)
RBC # BLD AUTO: 5.08 10E6/UL (ref 4.4–5.9)
SODIUM SERPL-SCNC: 137 MMOL/L (ref 135–145)
WBC # BLD AUTO: 13.9 10E3/UL (ref 4–11)

## 2025-08-08 PROCEDURE — 99284 EMERGENCY DEPT VISIT MOD MDM: CPT | Performed by: EMERGENCY MEDICINE

## 2025-08-08 PROCEDURE — 82310 ASSAY OF CALCIUM: CPT | Performed by: EMERGENCY MEDICINE

## 2025-08-08 PROCEDURE — 82962 GLUCOSE BLOOD TEST: CPT

## 2025-08-08 PROCEDURE — 85004 AUTOMATED DIFF WBC COUNT: CPT | Performed by: EMERGENCY MEDICINE

## 2025-08-08 PROCEDURE — 36415 COLL VENOUS BLD VENIPUNCTURE: CPT | Performed by: EMERGENCY MEDICINE

## 2025-08-08 PROCEDURE — 93005 ELECTROCARDIOGRAM TRACING: CPT | Performed by: EMERGENCY MEDICINE

## 2025-08-08 ASSESSMENT — COLUMBIA-SUICIDE SEVERITY RATING SCALE - C-SSRS
6. HAVE YOU EVER DONE ANYTHING, STARTED TO DO ANYTHING, OR PREPARED TO DO ANYTHING TO END YOUR LIFE?: NO
2. HAVE YOU ACTUALLY HAD ANY THOUGHTS OF KILLING YOURSELF IN THE PAST MONTH?: NO
1. IN THE PAST MONTH, HAVE YOU WISHED YOU WERE DEAD OR WISHED YOU COULD GO TO SLEEP AND NOT WAKE UP?: NO

## 2025-08-08 ASSESSMENT — ACTIVITIES OF DAILY LIVING (ADL)
ADLS_ACUITY_SCORE: 41
ADLS_ACUITY_SCORE: 41

## 2025-08-15 LAB
ATRIAL RATE - MUSE: 93 BPM
DIASTOLIC BLOOD PRESSURE - MUSE: 81 MMHG
INTERPRETATION ECG - MUSE: NORMAL
P AXIS - MUSE: 43 DEGREES
PR INTERVAL - MUSE: 156 MS
QRS DURATION - MUSE: 96 MS
QT - MUSE: 400 MS
QTC - MUSE: 497 MS
R AXIS - MUSE: 36 DEGREES
SYSTOLIC BLOOD PRESSURE - MUSE: 126 MMHG
T AXIS - MUSE: 63 DEGREES
VENTRICULAR RATE- MUSE: 93 BPM